# Patient Record
Sex: FEMALE | Race: OTHER | HISPANIC OR LATINO
[De-identification: names, ages, dates, MRNs, and addresses within clinical notes are randomized per-mention and may not be internally consistent; named-entity substitution may affect disease eponyms.]

---

## 2017-04-05 ENCOUNTER — RESULT REVIEW (OUTPATIENT)
Age: 72
End: 2017-04-05

## 2017-05-30 ENCOUNTER — RESULT REVIEW (OUTPATIENT)
Age: 72
End: 2017-05-30

## 2017-07-19 ENCOUNTER — APPOINTMENT (OUTPATIENT)
Dept: MAMMOGRAPHY | Facility: CLINIC | Age: 72
End: 2017-07-19

## 2017-07-19 ENCOUNTER — OUTPATIENT (OUTPATIENT)
Dept: OUTPATIENT SERVICES | Facility: HOSPITAL | Age: 72
LOS: 1 days | End: 2017-07-19
Payer: COMMERCIAL

## 2017-07-19 ENCOUNTER — APPOINTMENT (OUTPATIENT)
Dept: ULTRASOUND IMAGING | Facility: CLINIC | Age: 72
End: 2017-07-19

## 2017-07-19 DIAGNOSIS — Z00.8 ENCOUNTER FOR OTHER GENERAL EXAMINATION: ICD-10-CM

## 2017-07-19 PROCEDURE — G0279: CPT

## 2017-07-19 PROCEDURE — 76642 ULTRASOUND BREAST LIMITED: CPT

## 2017-07-19 PROCEDURE — 77066 DX MAMMO INCL CAD BI: CPT

## 2017-08-28 PROBLEM — N94.89 ENDOMETRIAL MASS: Status: ACTIVE | Noted: 2017-08-28

## 2017-08-28 PROBLEM — D05.12 DUCTAL CARCINOMA IN SITU (DCIS) OF LEFT BREAST: Status: ACTIVE | Noted: 2017-08-28

## 2017-08-29 ENCOUNTER — APPOINTMENT (OUTPATIENT)
Dept: GYNECOLOGIC ONCOLOGY | Facility: CLINIC | Age: 72
End: 2017-08-29
Payer: MEDICARE

## 2017-08-29 VITALS
HEART RATE: 59 BPM | DIASTOLIC BLOOD PRESSURE: 89 MMHG | BODY MASS INDEX: 26.19 KG/M2 | WEIGHT: 146 LBS | HEIGHT: 62.6 IN | SYSTOLIC BLOOD PRESSURE: 148 MMHG

## 2017-08-29 DIAGNOSIS — Z78.9 OTHER SPECIFIED HEALTH STATUS: ICD-10-CM

## 2017-08-29 DIAGNOSIS — N94.89 OTHER SPECIFIED CONDITIONS ASSOCIATED WITH FEMALE GENITAL ORGANS AND MENSTRUAL CYCLE: ICD-10-CM

## 2017-08-29 DIAGNOSIS — N95.0 POSTMENOPAUSAL BLEEDING: ICD-10-CM

## 2017-08-29 DIAGNOSIS — D05.12 INTRADUCTAL CARCINOMA IN SITU OF LEFT BREAST: ICD-10-CM

## 2017-08-29 PROCEDURE — 99205 OFFICE O/P NEW HI 60 MIN: CPT

## 2017-08-29 RX ORDER — CHROMIUM 200 MCG
TABLET ORAL
Refills: 0 | Status: ACTIVE | COMMUNITY

## 2017-09-08 ENCOUNTER — OUTPATIENT (OUTPATIENT)
Dept: OUTPATIENT SERVICES | Facility: HOSPITAL | Age: 72
LOS: 1 days | End: 2017-09-08
Payer: MEDICARE

## 2017-09-08 VITALS
WEIGHT: 147.93 LBS | DIASTOLIC BLOOD PRESSURE: 70 MMHG | TEMPERATURE: 97 F | HEIGHT: 63 IN | RESPIRATION RATE: 18 BRPM | OXYGEN SATURATION: 99 % | HEART RATE: 61 BPM | SYSTOLIC BLOOD PRESSURE: 104 MMHG

## 2017-09-08 DIAGNOSIS — Z90.89 ACQUIRED ABSENCE OF OTHER ORGANS: Chronic | ICD-10-CM

## 2017-09-08 DIAGNOSIS — N94.89 OTHER SPECIFIED CONDITIONS ASSOCIATED WITH FEMALE GENITAL ORGANS AND MENSTRUAL CYCLE: ICD-10-CM

## 2017-09-08 DIAGNOSIS — R94.31 ABNORMAL ELECTROCARDIOGRAM [ECG] [EKG]: ICD-10-CM

## 2017-09-08 DIAGNOSIS — N85.9 NONINFLAMMATORY DISORDER OF UTERUS, UNSPECIFIED: ICD-10-CM

## 2017-09-08 DIAGNOSIS — S82.899A OTHER FRACTURE OF UNSPECIFIED LOWER LEG, INITIAL ENCOUNTER FOR CLOSED FRACTURE: Chronic | ICD-10-CM

## 2017-09-08 DIAGNOSIS — Z98.890 OTHER SPECIFIED POSTPROCEDURAL STATES: Chronic | ICD-10-CM

## 2017-09-08 DIAGNOSIS — H26.9 UNSPECIFIED CATARACT: Chronic | ICD-10-CM

## 2017-09-08 LAB
ALBUMIN SERPL ELPH-MCNC: 4.5 G/DL — SIGNIFICANT CHANGE UP (ref 3.3–5)
ALP SERPL-CCNC: 46 U/L — SIGNIFICANT CHANGE UP (ref 40–120)
ALT FLD-CCNC: 14 U/L — SIGNIFICANT CHANGE UP (ref 4–33)
AST SERPL-CCNC: 16 U/L — SIGNIFICANT CHANGE UP (ref 4–32)
BILIRUB SERPL-MCNC: 0.5 MG/DL — SIGNIFICANT CHANGE UP (ref 0.2–1.2)
BLD GP AB SCN SERPL QL: NEGATIVE — SIGNIFICANT CHANGE UP
BUN SERPL-MCNC: 12 MG/DL — SIGNIFICANT CHANGE UP (ref 7–23)
CALCIUM SERPL-MCNC: 8.6 MG/DL — SIGNIFICANT CHANGE UP (ref 8.4–10.5)
CHLORIDE SERPL-SCNC: 104 MMOL/L — SIGNIFICANT CHANGE UP (ref 98–107)
CO2 SERPL-SCNC: 30 MMOL/L — SIGNIFICANT CHANGE UP (ref 22–31)
CREAT SERPL-MCNC: 0.6 MG/DL — SIGNIFICANT CHANGE UP (ref 0.5–1.3)
GLUCOSE SERPL-MCNC: 84 MG/DL — SIGNIFICANT CHANGE UP (ref 70–99)
HCT VFR BLD CALC: 42.7 % — SIGNIFICANT CHANGE UP (ref 34.5–45)
HGB BLD-MCNC: 13.5 G/DL — SIGNIFICANT CHANGE UP (ref 11.5–15.5)
MCHC RBC-ENTMCNC: 30.2 PG — SIGNIFICANT CHANGE UP (ref 27–34)
MCHC RBC-ENTMCNC: 31.6 % — LOW (ref 32–36)
MCV RBC AUTO: 95.5 FL — SIGNIFICANT CHANGE UP (ref 80–100)
NRBC # FLD: 0 — SIGNIFICANT CHANGE UP
PLATELET # BLD AUTO: 166 K/UL — SIGNIFICANT CHANGE UP (ref 150–400)
PMV BLD: 11.5 FL — SIGNIFICANT CHANGE UP (ref 7–13)
POTASSIUM SERPL-MCNC: 4.6 MMOL/L — SIGNIFICANT CHANGE UP (ref 3.5–5.3)
POTASSIUM SERPL-SCNC: 4.6 MMOL/L — SIGNIFICANT CHANGE UP (ref 3.5–5.3)
PROT SERPL-MCNC: 7.1 G/DL — SIGNIFICANT CHANGE UP (ref 6–8.3)
RBC # BLD: 4.47 M/UL — SIGNIFICANT CHANGE UP (ref 3.8–5.2)
RBC # FLD: 13.6 % — SIGNIFICANT CHANGE UP (ref 10.3–14.5)
RH IG SCN BLD-IMP: POSITIVE — SIGNIFICANT CHANGE UP
SODIUM SERPL-SCNC: 144 MMOL/L — SIGNIFICANT CHANGE UP (ref 135–145)
WBC # BLD: 3.69 K/UL — LOW (ref 3.8–10.5)
WBC # FLD AUTO: 3.69 K/UL — LOW (ref 3.8–10.5)

## 2017-09-08 PROCEDURE — 93010 ELECTROCARDIOGRAM REPORT: CPT

## 2017-09-08 RX ORDER — SODIUM CHLORIDE 9 MG/ML
1000 INJECTION, SOLUTION INTRAVENOUS
Qty: 0 | Refills: 0 | Status: DISCONTINUED | OUTPATIENT
Start: 2017-09-14 | End: 2017-09-29

## 2017-09-08 NOTE — H&P PST ADULT - PROBLEM SELECTOR PLAN 2
Pt walks 10,000 steps daily and exercises 30 minutes daily; pt denies cp, palpitations, sob; pt offers no c/o  EKG done ; noted to be abnormal ; anterolateral ischemia; ekg comparison obtained changes noted; Reviewed with Dr Goncalves ; pt continues to offer no c/o    EKG faxed to Dr Santiago ; s/w Dr Santiago ; pt referred directly to Dr Gillespie for cardiology evaluation ; pt continues to offer no c/o. instructed pt to call 911 or go directly to ER if any cp, sob .. noted pt in agreement    Pt accompanied by niece and spouse went directly from PST to cardiologist for evaluation Pt walks 10,000 steps daily and exercises 30 minutes daily; pt denies cp, palpitations, sob; pt offers no c/o  EKG done ; noted to be abnormal ; on chart ; ekg comparison obtained changes    ;  Reviewed with Dr Goncalves ; pt continues to offer no c/o    EKG faxed to Dr Santiago ; s/w Dr Santiago ; pt referred directly to Dr Gillespie for cardiology evaluation ; pt continues to offer no c/o. instructed pt to call 911 or go directly to ER if any cp, sob .. noted pt in agreement    Pt accompanied by niece and spouse went directly from PST to cardiologist for evaluation    Awaiting cardiac clearance Ti @ surgeons office aware Pt walks 10,000 steps daily and exercises 30 minutes daily; pt denies cp, palpitations, sob; pt offers no c/o  EKG done ; noted to be abnormal ; on chart ; ekg comparison obtained changes    ;  Reviewed with Dr Goncalves ; pt continues to offer no c/o    EKG faxed to Dr Santiago ; s/w Dr Santiago ;ekg and comparison reviewed with Dr Santiago  pt referred directly to Dr Gillespie for cardiology evaluation ; pt continues to offer no c/o. instructed pt to call 911 or go directly to ER if any cp, sob .. noted pt in agreement    Pt accompanied by niece and spouse went directly from PST to cardiologist for evaluation  Dr Goncalves aware    Awaiting cardiac clearance Ti @ surgeons office aware

## 2017-09-08 NOTE — H&P PST ADULT - PSH
Cataract of both eyes  tx surgically  Fracture of ankle  s/p ORIF Left Ankle Fracture  H/O lumpectomy  Left Breast    (normal spontaneous vaginal delivery)  x2  S/P tonsillectomy

## 2017-09-08 NOTE — H&P PST ADULT - LYMPHATIC
supraclavicular L/posterior cervical L/anterior cervical L/anterior cervical R/supraclavicular R/posterior cervical R

## 2017-09-08 NOTE — H&P PST ADULT - ASSESSMENT
Uterine Mass Pt is  a 72 y.o. female pt reports h/o Lumpectomy Left Breast 11/16 secondary to breast cancer. Pt states during w/u a uterine mass was found ; pt to surgeon a c/t scan , mri was done ; pt now presents for surgery ;[  see plan of care]

## 2017-09-08 NOTE — H&P PST ADULT - HISTORY OF PRESENT ILLNESS
Pt is a 72 y.o. female pt reports h/o Lumpectomy Left Breast 11/16 secondary to breast cancer. Pt states during w/u a uterine mass was found ; pt to surgeon a c/t scan , mri was done ; pt now presents for surgery ; see plan of care]     Pt reports occasional vaginal bleeding occasional cramping Pt is a 72 y.o. female pt reports h/o Lumpectomy Left Breast 11/16 secondary to breast cancer. Pt states during w/u a endometrial mass was found ; pt to surgeon a c/t scan , mri was done ; pt now presents for surgery ; see plan of care.    Pt reports occasional vaginal bleeding occasional cramping

## 2017-09-12 ENCOUNTER — OUTPATIENT (OUTPATIENT)
Dept: OUTPATIENT SERVICES | Facility: HOSPITAL | Age: 72
LOS: 1 days | End: 2017-09-12
Payer: COMMERCIAL

## 2017-09-12 DIAGNOSIS — R94.31 ABNORMAL ELECTROCARDIOGRAM [ECG] [EKG]: ICD-10-CM

## 2017-09-12 DIAGNOSIS — Z01.810 ENCOUNTER FOR PREPROCEDURAL CARDIOVASCULAR EXAMINATION: ICD-10-CM

## 2017-09-12 DIAGNOSIS — S82.899A OTHER FRACTURE OF UNSPECIFIED LOWER LEG, INITIAL ENCOUNTER FOR CLOSED FRACTURE: Chronic | ICD-10-CM

## 2017-09-12 DIAGNOSIS — Z90.89 ACQUIRED ABSENCE OF OTHER ORGANS: Chronic | ICD-10-CM

## 2017-09-12 DIAGNOSIS — H26.9 UNSPECIFIED CATARACT: Chronic | ICD-10-CM

## 2017-09-12 DIAGNOSIS — Z98.890 OTHER SPECIFIED POSTPROCEDURAL STATES: Chronic | ICD-10-CM

## 2017-09-12 PROCEDURE — 93018 CV STRESS TEST I&R ONLY: CPT

## 2017-09-12 PROCEDURE — 93016 CV STRESS TEST SUPVJ ONLY: CPT

## 2017-09-12 PROCEDURE — 93017 CV STRESS TEST TRACING ONLY: CPT

## 2017-09-12 PROCEDURE — 93306 TTE W/DOPPLER COMPLETE: CPT

## 2017-09-12 PROCEDURE — 93306 TTE W/DOPPLER COMPLETE: CPT | Mod: 26

## 2017-09-14 ENCOUNTER — OUTPATIENT (OUTPATIENT)
Dept: OUTPATIENT SERVICES | Facility: HOSPITAL | Age: 72
LOS: 1 days | Discharge: HOME | End: 2017-09-14
Payer: MEDICARE

## 2017-09-14 ENCOUNTER — TRANSCRIPTION ENCOUNTER (OUTPATIENT)
Age: 72
End: 2017-09-14

## 2017-09-14 ENCOUNTER — APPOINTMENT (OUTPATIENT)
Dept: GYNECOLOGIC ONCOLOGY | Facility: HOSPITAL | Age: 72
End: 2017-09-14

## 2017-09-14 ENCOUNTER — RESULT REVIEW (OUTPATIENT)
Age: 72
End: 2017-09-14

## 2017-09-14 VITALS
DIASTOLIC BLOOD PRESSURE: 64 MMHG | RESPIRATION RATE: 16 BRPM | OXYGEN SATURATION: 95 % | HEART RATE: 76 BPM | SYSTOLIC BLOOD PRESSURE: 113 MMHG

## 2017-09-14 VITALS
TEMPERATURE: 98 F | HEART RATE: 59 BPM | DIASTOLIC BLOOD PRESSURE: 80 MMHG | HEIGHT: 63 IN | OXYGEN SATURATION: 98 % | RESPIRATION RATE: 16 BRPM | WEIGHT: 147.93 LBS | SYSTOLIC BLOOD PRESSURE: 151 MMHG

## 2017-09-14 DIAGNOSIS — S82.899A OTHER FRACTURE OF UNSPECIFIED LOWER LEG, INITIAL ENCOUNTER FOR CLOSED FRACTURE: Chronic | ICD-10-CM

## 2017-09-14 DIAGNOSIS — H26.9 UNSPECIFIED CATARACT: Chronic | ICD-10-CM

## 2017-09-14 DIAGNOSIS — Z98.890 OTHER SPECIFIED POSTPROCEDURAL STATES: Chronic | ICD-10-CM

## 2017-09-14 DIAGNOSIS — N94.89 OTHER SPECIFIED CONDITIONS ASSOCIATED WITH FEMALE GENITAL ORGANS AND MENSTRUAL CYCLE: ICD-10-CM

## 2017-09-14 DIAGNOSIS — Z90.89 ACQUIRED ABSENCE OF OTHER ORGANS: Chronic | ICD-10-CM

## 2017-09-14 LAB
BASOPHILS # BLD AUTO: 0.01 K/UL — SIGNIFICANT CHANGE UP (ref 0–0.2)
BASOPHILS NFR BLD AUTO: 0.1 % — SIGNIFICANT CHANGE UP (ref 0–2)
BUN SERPL-MCNC: 11 MG/DL — SIGNIFICANT CHANGE UP (ref 7–23)
CALCIUM SERPL-MCNC: 8.4 MG/DL — SIGNIFICANT CHANGE UP (ref 8.4–10.5)
CHLORIDE SERPL-SCNC: 98 MMOL/L — SIGNIFICANT CHANGE UP (ref 98–107)
CO2 SERPL-SCNC: 27 MMOL/L — SIGNIFICANT CHANGE UP (ref 22–31)
CREAT SERPL-MCNC: 0.55 MG/DL — SIGNIFICANT CHANGE UP (ref 0.5–1.3)
EOSINOPHIL # BLD AUTO: 0 K/UL — SIGNIFICANT CHANGE UP (ref 0–0.5)
EOSINOPHIL NFR BLD AUTO: 0 % — SIGNIFICANT CHANGE UP (ref 0–6)
GLUCOSE SERPL-MCNC: 170 MG/DL — HIGH (ref 70–99)
HCT VFR BLD CALC: 38.8 % — SIGNIFICANT CHANGE UP (ref 34.5–45)
HGB BLD-MCNC: 12.7 G/DL — SIGNIFICANT CHANGE UP (ref 11.5–15.5)
IMM GRANULOCYTES # BLD AUTO: 0.03 # — SIGNIFICANT CHANGE UP
IMM GRANULOCYTES NFR BLD AUTO: 0.4 % — SIGNIFICANT CHANGE UP (ref 0–1.5)
LYMPHOCYTES # BLD AUTO: 0.38 K/UL — LOW (ref 1–3.3)
LYMPHOCYTES # BLD AUTO: 4.7 % — LOW (ref 13–44)
MAGNESIUM SERPL-MCNC: 1.6 MG/DL — SIGNIFICANT CHANGE UP (ref 1.6–2.6)
MCHC RBC-ENTMCNC: 31 PG — SIGNIFICANT CHANGE UP (ref 27–34)
MCHC RBC-ENTMCNC: 32.7 % — SIGNIFICANT CHANGE UP (ref 32–36)
MCV RBC AUTO: 94.6 FL — SIGNIFICANT CHANGE UP (ref 80–100)
MONOCYTES # BLD AUTO: 0.18 K/UL — SIGNIFICANT CHANGE UP (ref 0–0.9)
MONOCYTES NFR BLD AUTO: 2.2 % — SIGNIFICANT CHANGE UP (ref 2–14)
NEUTROPHILS # BLD AUTO: 7.41 K/UL — HIGH (ref 1.8–7.4)
NEUTROPHILS NFR BLD AUTO: 92.6 % — HIGH (ref 43–77)
NRBC # FLD: 0 — SIGNIFICANT CHANGE UP
PHOSPHATE SERPL-MCNC: 2.8 MG/DL — SIGNIFICANT CHANGE UP (ref 2.5–4.5)
PLATELET # BLD AUTO: 145 K/UL — LOW (ref 150–400)
PMV BLD: 11.2 FL — SIGNIFICANT CHANGE UP (ref 7–13)
POTASSIUM SERPL-MCNC: 3.2 MMOL/L — LOW (ref 3.5–5.3)
POTASSIUM SERPL-SCNC: 3.2 MMOL/L — LOW (ref 3.5–5.3)
RBC # BLD: 4.1 M/UL — SIGNIFICANT CHANGE UP (ref 3.8–5.2)
RBC # FLD: 13.2 % — SIGNIFICANT CHANGE UP (ref 10.3–14.5)
REVIEW TO FOLLOW: YES — SIGNIFICANT CHANGE UP
RH IG SCN BLD-IMP: POSITIVE — SIGNIFICANT CHANGE UP
SODIUM SERPL-SCNC: 139 MMOL/L — SIGNIFICANT CHANGE UP (ref 135–145)
WBC # BLD: 8.01 K/UL — SIGNIFICANT CHANGE UP (ref 3.8–10.5)
WBC # FLD AUTO: 8.01 K/UL — SIGNIFICANT CHANGE UP (ref 3.8–10.5)

## 2017-09-14 PROCEDURE — 88331 PATH CONSLTJ SURG 1 BLK 1SPC: CPT | Mod: 26

## 2017-09-14 PROCEDURE — 88307 TISSUE EXAM BY PATHOLOGIST: CPT | Mod: 26

## 2017-09-14 PROCEDURE — S2900 ROBOTIC SURGICAL SYSTEM: CPT | Mod: NC

## 2017-09-14 PROCEDURE — 58571 TLH W/T/O 250 G OR LESS: CPT | Mod: GC

## 2017-09-14 RX ORDER — KETOROLAC TROMETHAMINE 30 MG/ML
15 SYRINGE (ML) INJECTION ONCE
Qty: 0 | Refills: 0 | Status: DISCONTINUED | OUTPATIENT
Start: 2017-09-14 | End: 2017-09-14

## 2017-09-14 RX ORDER — FENTANYL CITRATE 50 UG/ML
25 INJECTION INTRAVENOUS
Qty: 0 | Refills: 0 | Status: DISCONTINUED | OUTPATIENT
Start: 2017-09-14 | End: 2017-09-14

## 2017-09-14 RX ORDER — SODIUM CHLORIDE 9 MG/ML
1000 INJECTION, SOLUTION INTRAVENOUS
Qty: 0 | Refills: 0 | Status: DISCONTINUED | OUTPATIENT
Start: 2017-09-14 | End: 2017-09-29

## 2017-09-14 RX ORDER — MEPERIDINE HYDROCHLORIDE 50 MG/ML
12.5 INJECTION INTRAMUSCULAR; INTRAVENOUS; SUBCUTANEOUS
Qty: 0 | Refills: 0 | Status: DISCONTINUED | OUTPATIENT
Start: 2017-09-14 | End: 2017-09-14

## 2017-09-14 RX ORDER — FENTANYL CITRATE 50 UG/ML
50 INJECTION INTRAVENOUS
Qty: 0 | Refills: 0 | Status: DISCONTINUED | OUTPATIENT
Start: 2017-09-14 | End: 2017-09-14

## 2017-09-14 RX ORDER — POTASSIUM CHLORIDE 20 MEQ
20 PACKET (EA) ORAL
Qty: 0 | Refills: 0 | Status: COMPLETED | OUTPATIENT
Start: 2017-09-14 | End: 2017-09-14

## 2017-09-14 RX ORDER — HEPARIN SODIUM 5000 [USP'U]/ML
5000 INJECTION INTRAVENOUS; SUBCUTANEOUS ONCE
Qty: 0 | Refills: 0 | Status: COMPLETED | OUTPATIENT
Start: 2017-09-14 | End: 2017-09-14

## 2017-09-14 RX ORDER — ANASTROZOLE 1 MG/1
1 TABLET ORAL
Qty: 0 | Refills: 0 | COMMUNITY

## 2017-09-14 RX ORDER — DENOSUMAB 60 MG/ML
0 INJECTION SUBCUTANEOUS
Qty: 0 | Refills: 0 | COMMUNITY

## 2017-09-14 RX ADMIN — Medication 20 MILLIEQUIVALENT(S): at 17:13

## 2017-09-14 RX ADMIN — FENTANYL CITRATE 50 MICROGRAM(S): 50 INJECTION INTRAVENOUS at 12:59

## 2017-09-14 RX ADMIN — Medication 20 MILLIEQUIVALENT(S): at 19:10

## 2017-09-14 RX ADMIN — FENTANYL CITRATE 50 MICROGRAM(S): 50 INJECTION INTRAVENOUS at 13:28

## 2017-09-14 RX ADMIN — FENTANYL CITRATE 50 MICROGRAM(S): 50 INJECTION INTRAVENOUS at 14:45

## 2017-09-14 RX ADMIN — SODIUM CHLORIDE 30 MILLILITER(S): 9 INJECTION, SOLUTION INTRAVENOUS at 09:20

## 2017-09-14 RX ADMIN — Medication 15 MILLIGRAM(S): at 18:40

## 2017-09-14 RX ADMIN — Medication 15 MILLIGRAM(S): at 18:21

## 2017-09-14 RX ADMIN — SODIUM CHLORIDE 110 MILLILITER(S): 9 INJECTION, SOLUTION INTRAVENOUS at 13:10

## 2017-09-14 RX ADMIN — FENTANYL CITRATE 50 MICROGRAM(S): 50 INJECTION INTRAVENOUS at 13:45

## 2017-09-14 RX ADMIN — FENTANYL CITRATE 50 MICROGRAM(S): 50 INJECTION INTRAVENOUS at 14:27

## 2017-09-14 RX ADMIN — FENTANYL CITRATE 50 MICROGRAM(S): 50 INJECTION INTRAVENOUS at 12:55

## 2017-09-14 RX ADMIN — FENTANYL CITRATE 50 MICROGRAM(S): 50 INJECTION INTRAVENOUS at 12:39

## 2017-09-14 RX ADMIN — HEPARIN SODIUM 5000 UNIT(S): 5000 INJECTION INTRAVENOUS; SUBCUTANEOUS at 09:20

## 2017-09-14 NOTE — ASU DISCHARGE PLAN (ADULT/PEDIATRIC). - POST OP PHONE #
home: [phone] cell: [phone] pt. granted permission to leave message /and or speak with whoever answers the phone.

## 2017-09-14 NOTE — ASU DISCHARGE PLAN (ADULT/PEDIATRIC). - MEDICATION SUMMARY - MEDICATIONS TO TAKE
I will START or STAY ON the medications listed below when I get home from the hospital:    vitamin d  -- 1 tab(s) by mouth once a day  -- Indication: For Home med    calcium  -- 1 tab(s) by mouth once a day am  -- Indication: For Home med    Percocet 5/325 oral tablet  -- 1 tab(s) by mouth every 6 hours, As Needed -for severe pain MDD:4 tabs   -- Caution federal law prohibits the transfer of this drug to any person other  than the person for whom it was prescribed.  May cause drowsiness.  Alcohol may intensify this effect.  Use care when operating dangerous machinery.  This prescription cannot be refilled.  This product contains acetaminophen.  Do not use  with any other product containing acetaminophen to prevent possible liver damage.  Using more of this medication than prescribed may cause serious breathing problems.    -- Indication: For Pain    Naprosyn 500 mg oral tablet  -- 1 tab(s) by mouth every 12 hours, As Needed -for moderate pain   -- Check with your doctor before becoming pregnant.  May cause drowsiness or dizziness.  Obtain medical advice before taking any non-prescription drugs as some may affect the action of this medication.  Take with food or milk.    -- Indication: For Pain    Arimidex 1 mg oral tablet  -- 1 tab(s) by mouth once a day am  -- Indication: For Home med    Prolia 60 mg/mL subcutaneous solution  --  subcutaneous every 6 months for osteoporosis  -- Indication: For Home med

## 2017-09-14 NOTE — BRIEF OPERATIVE NOTE - OPERATION/FINDINGS
Small retroverted uterus on bimanual exam, grossly normal uterus, tubes, and ovaries b/l during laparoscopy. Grossly normal mesentary, liver, and spleen on abdominal examination. Frozen section showed benign endometrial polyp.

## 2017-09-14 NOTE — BRIEF OPERATIVE NOTE - PRE-OP DX
Endometrial thickening on ultra sound  09/14/2017    Active  Abelardo García  Postmenopausal bleeding  09/14/2017    Active  Abelardo García

## 2017-09-14 NOTE — ASU DISCHARGE PLAN (ADULT/PEDIATRIC). - NURSING INSTRUCTIONS
Make follow-up appointment. Please call your doctor if you experience fever greater than 100.4, chills, nausea, vomiting or if you notice new redness, swelling, discoloration, or discharge from your abdominal incisions. Also notify your doctor if you experience severe pain that is not relieved with medications. Narcotic pain medications may cause drowsiness and/or constipation. Do not drive while taking percocet. Drink plenty of fluids to promote hydration and increase your fiber intake. Shower as directed by the doctor. Do not scrub incision site. Allow soapy water to run over them and pat dry. Make follow-up appointment. Please call your doctor if you experience fever greater than 100.4, chills, nausea, vomiting or if you notice new redness, swelling, discoloration, or discharge from your abdominal incisions. Also notify your doctor if you experience severe pain that is not relieved with medications. Narcotic pain medications may cause drowsiness and/or constipation. Do not drive while taking percocet. Drink plenty of fluids to promote hydration and increase your fiber intake. Remove pink dressing in 48 hours. Leave steri strips intact. Shower as directed by the doctor. Do not scrub incision site. Allow soapy water to run over them and pat dry. Do not remove steri strips. They will fall off on their own or doctor to remove during follow-up visit. Make follow-up appointment. Please call your doctor if you experience fever greater than 100.4, chills, nausea, vomiting or if you notice new redness, swelling, discoloration, or discharge from your abdominal incisions. Also notify your doctor if you experience severe pain that is not relieved with medications. Narcotic pain medications may cause drowsiness and/or constipation. Do not drive while taking percocet. Drink plenty of fluids to promote hydration and increase your fiber intake. Remove pink dressing in 48 hours. Do not remove steri strips. They will fall off on their own or doctor to remove during follow-up visit. Shower as directed by the doctor. Do not scrub incision site. Allow soapy water to run over them and pat dry.     Nothing in vagina, no intercourse, no douching, no tampons, no tub baths, and no swimming for 2 weeks or until cleared by your doctor. Use sanitary pads if needed.  Do not use tampons. This will help prevent a vaginal infection.

## 2017-09-14 NOTE — ASU DISCHARGE PLAN (ADULT/PEDIATRIC). - ACTIVITY LEVEL
weight bearing as tolerated/no douching/no tampons/no intercourse/no heavy lifting/nothing per rectum/nothing per vagina/no tub baths

## 2017-09-14 NOTE — BRIEF OPERATIVE NOTE - PROCEDURE
<<-----Click on this checkbox to enter Procedure Hysterectomy, robot-assisted, laparoscopic, with BSO  09/14/2017    Active  ECRFIELD

## 2017-09-14 NOTE — ASU DISCHARGE PLAN (ADULT/PEDIATRIC). - NOTIFY
Persistent Nausea and Vomiting/Swelling that continues/GYN Fever>100.4/Inability to Tolerate Liquids or Foods/Bleeding that does not stop/Unable to Urinate/Pain not relieved by Medications

## 2017-09-21 LAB — SURGICAL PATHOLOGY STUDY: SIGNIFICANT CHANGE UP

## 2017-09-29 ENCOUNTER — APPOINTMENT (OUTPATIENT)
Dept: GYNECOLOGIC ONCOLOGY | Facility: CLINIC | Age: 72
End: 2017-09-29
Payer: MEDICARE

## 2017-09-29 VITALS
WEIGHT: 144 LBS | HEART RATE: 76 BPM | HEIGHT: 62 IN | SYSTOLIC BLOOD PRESSURE: 125 MMHG | BODY MASS INDEX: 26.5 KG/M2 | DIASTOLIC BLOOD PRESSURE: 83 MMHG | TEMPERATURE: 98.8 F

## 2017-09-29 PROCEDURE — 99024 POSTOP FOLLOW-UP VISIT: CPT | Mod: NC

## 2017-10-30 ENCOUNTER — APPOINTMENT (OUTPATIENT)
Dept: GYNECOLOGIC ONCOLOGY | Facility: CLINIC | Age: 72
End: 2017-10-30
Payer: MEDICARE

## 2017-10-30 VITALS
TEMPERATURE: 97.6 F | DIASTOLIC BLOOD PRESSURE: 75 MMHG | BODY MASS INDEX: 26.31 KG/M2 | WEIGHT: 143 LBS | HEIGHT: 62 IN | HEART RATE: 61 BPM | SYSTOLIC BLOOD PRESSURE: 131 MMHG

## 2017-10-30 DIAGNOSIS — N84.0 POLYP OF CORPUS UTERI: ICD-10-CM

## 2017-10-30 DIAGNOSIS — D25.9 LEIOMYOMA OF UTERUS, UNSPECIFIED: ICD-10-CM

## 2017-10-30 PROCEDURE — 99024 POSTOP FOLLOW-UP VISIT: CPT

## 2018-01-11 ENCOUNTER — APPOINTMENT (OUTPATIENT)
Dept: UROLOGY | Facility: CLINIC | Age: 73
End: 2018-01-11
Payer: MEDICARE

## 2018-01-11 VITALS
HEIGHT: 63 IN | TEMPERATURE: 97.6 F | HEART RATE: 75 BPM | BODY MASS INDEX: 26.58 KG/M2 | WEIGHT: 150 LBS | SYSTOLIC BLOOD PRESSURE: 132 MMHG | DIASTOLIC BLOOD PRESSURE: 81 MMHG

## 2018-01-11 LAB
BILIRUB UR QL STRIP: NORMAL
CLARITY UR: CLEAR
COLLECTION METHOD: NORMAL
GLUCOSE UR-MCNC: NORMAL
HCG UR QL: 0.2 EU/DL
HGB UR QL STRIP.AUTO: NORMAL
KETONES UR-MCNC: NORMAL
LEUKOCYTE ESTERASE UR QL STRIP: NORMAL
NITRITE UR QL STRIP: NORMAL
PH UR STRIP: 6.5
PROT UR STRIP-MCNC: NORMAL
SP GR UR STRIP: 1.02

## 2018-01-11 PROCEDURE — 51798 US URINE CAPACITY MEASURE: CPT

## 2018-01-11 PROCEDURE — 81003 URINALYSIS AUTO W/O SCOPE: CPT | Mod: QW

## 2018-01-11 PROCEDURE — 99204 OFFICE O/P NEW MOD 45 MIN: CPT | Mod: 25

## 2018-01-12 LAB
APPEARANCE: CLEAR
BACTERIA: NEGATIVE
BILIRUBIN URINE: NEGATIVE
BLOOD URINE: ABNORMAL
COLOR: YELLOW
CORE LAB FLUID CYTOLOGY: NORMAL
GLUCOSE QUALITATIVE U: NEGATIVE MG/DL
KETONES URINE: NEGATIVE
LEUKOCYTE ESTERASE URINE: ABNORMAL
MICROSCOPIC-UA: NORMAL
NITRITE URINE: NEGATIVE
PH URINE: 6
PROTEIN URINE: NEGATIVE MG/DL
RED BLOOD CELLS URINE: 10 /HPF
SPECIFIC GRAVITY URINE: 1.02
SQUAMOUS EPITHELIAL CELLS: 3 /HPF
UROBILINOGEN URINE: NEGATIVE MG/DL
WHITE BLOOD CELLS URINE: 4 /HPF

## 2018-01-16 LAB — BACTERIA UR CULT: NORMAL

## 2018-02-07 ENCOUNTER — APPOINTMENT (OUTPATIENT)
Dept: UROLOGY | Facility: CLINIC | Age: 73
End: 2018-02-07
Payer: COMMERCIAL

## 2018-02-07 VITALS
HEIGHT: 63 IN | BODY MASS INDEX: 26.58 KG/M2 | SYSTOLIC BLOOD PRESSURE: 125 MMHG | WEIGHT: 150 LBS | DIASTOLIC BLOOD PRESSURE: 77 MMHG

## 2018-02-07 DIAGNOSIS — N28.1 CYST OF KIDNEY, ACQUIRED: ICD-10-CM

## 2018-02-07 PROCEDURE — 99213 OFFICE O/P EST LOW 20 MIN: CPT | Mod: 25

## 2018-02-07 PROCEDURE — 52000 CYSTOURETHROSCOPY: CPT

## 2018-05-08 ENCOUNTER — MOBILE ON CALL (OUTPATIENT)
Age: 73
End: 2018-05-08

## 2018-05-08 LAB
APPEARANCE: CLEAR
BACTERIA: NEGATIVE
BILIRUBIN URINE: NEGATIVE
BLOOD URINE: ABNORMAL
COLOR: YELLOW
GLUCOSE QUALITATIVE U: NEGATIVE MG/DL
HYALINE CASTS: 2 /LPF
KETONES URINE: NEGATIVE
LEUKOCYTE ESTERASE URINE: NEGATIVE
MICROSCOPIC-UA: NORMAL
NITRITE URINE: NEGATIVE
PH URINE: 7
PROTEIN URINE: NEGATIVE MG/DL
RED BLOOD CELLS URINE: 1 /HPF
SPECIFIC GRAVITY URINE: 1.02
SQUAMOUS EPITHELIAL CELLS: 2 /HPF
UROBILINOGEN URINE: NEGATIVE MG/DL
WHITE BLOOD CELLS URINE: 0 /HPF

## 2018-05-09 LAB — BACTERIA UR CULT: NORMAL

## 2018-05-16 ENCOUNTER — APPOINTMENT (OUTPATIENT)
Dept: UROLOGY | Facility: CLINIC | Age: 73
End: 2018-05-16
Payer: COMMERCIAL

## 2018-05-16 VITALS
WEIGHT: 150 LBS | DIASTOLIC BLOOD PRESSURE: 89 MMHG | SYSTOLIC BLOOD PRESSURE: 155 MMHG | HEIGHT: 63 IN | HEART RATE: 68 BPM | BODY MASS INDEX: 26.58 KG/M2

## 2018-05-16 PROCEDURE — 52224 CYSTOSCOPY AND TREATMENT: CPT

## 2018-05-22 ENCOUNTER — MESSAGE (OUTPATIENT)
Age: 73
End: 2018-05-22

## 2018-05-23 ENCOUNTER — APPOINTMENT (OUTPATIENT)
Dept: UROLOGY | Facility: CLINIC | Age: 73
End: 2018-05-23
Payer: COMMERCIAL

## 2018-05-23 VITALS
SYSTOLIC BLOOD PRESSURE: 145 MMHG | HEIGHT: 63 IN | DIASTOLIC BLOOD PRESSURE: 96 MMHG | BODY MASS INDEX: 26.58 KG/M2 | WEIGHT: 150 LBS | TEMPERATURE: 98 F | HEART RATE: 84 BPM

## 2018-05-23 DIAGNOSIS — R35.1 NOCTURIA: ICD-10-CM

## 2018-05-23 DIAGNOSIS — N32.9 BLADDER DISORDER, UNSPECIFIED: ICD-10-CM

## 2018-05-23 DIAGNOSIS — N39.46 MIXED INCONTINENCE: ICD-10-CM

## 2018-05-23 DIAGNOSIS — R31.29 OTHER MICROSCOPIC HEMATURIA: ICD-10-CM

## 2018-05-23 LAB — CORE LAB BIOPSY: NORMAL

## 2018-05-23 PROCEDURE — 99213 OFFICE O/P EST LOW 20 MIN: CPT

## 2018-10-03 PROBLEM — D25.9 LEIOMYOMA OF UTERUS: Status: ACTIVE | Noted: 2017-09-29

## 2018-10-03 PROBLEM — N84.0 ENDOMETRIAL POLYP: Status: ACTIVE | Noted: 2017-09-29

## 2018-10-17 NOTE — H&P PST ADULT - PROBLEM SELECTOR PLAN 1
normal... Procedure as booked ; Robotic Assisted total laparoscopic Bilateral Salpingo Oophorectomy possible staging; pt states also Hysterectomy  ; call to OR Booking s/w Tiara; call to surgeons office message left for TY awaiting return call    Pre op instructions given to pt including Pepcid and Hibiclens ; pt appears to have a good understanding of pre op instructions    Pt to Dr Santiago for pre op m/c Procedure as booked ; Robotic Assisted total laparoscopic Bilateral Salpingo Oophorectomy possible staging; pt states also Hysterectomy  ; call to OR Booking s/w Tiara; call to surgeons office s/w Kevyn;  Correct procedure "Robotic assisted Total laparoscopic Hysterectomy Bilateral Salpingo Oophorectomy possible Staging "      Pre op instructions given to pt including Pepcid and Hibiclens ; pt appears to have a good understanding of pre op instructions    Pt to Dr Santiago for pre op m/c Procedure as booked ; Robotic Assisted total laparoscopic Bilateral Salpingo Oophorectomy possible staging; pt states also Hysterectomy  ; call to OR Booking s/w Tiara; call to surgeons office s/w Kevyn;  Correct procedure "Robotic assisted Total laparoscopic Hysterectomy Bilateral Salpingo Oophorectomy possible Staging "    OR booking to correct      Pre op instructions given to pt including Pepcid and Hibiclens ; pt appears to have a good understanding of pre op instructions    Pt to Dr Santiago for pre op m/c Procedure as booked ; Robotic Assisted total laparoscopic Bilateral Salpingo Oophorectomy possible staging; pt states also Hysterectomy  ; call to OR Booking s/w Tiara; call to surgeons office s/w Kevyn;  Correct procedure "Robotic assisted Total laparoscopic Hysterectomy Bilateral Salpingo Oophorectomy possible Staging "  9/12/17 New booking sheet ; Robotic Assisted Total Laparoscopic Hysterectomy Bilateral Salpingo Oophorectomy Possible Staging     OR booking to correct      Pre op instructions given to pt including Pepcid and Hibiclens ; pt appears to have a good understanding of pre op instructions    Pt to Dr Santiago for pre op m/c

## 2020-04-02 PROBLEM — N32.9 LESION OF BLADDER: Status: ACTIVE | Noted: 2018-05-16

## 2020-05-27 PROBLEM — M81.0 AGE-RELATED OSTEOPOROSIS WITHOUT CURRENT PATHOLOGICAL FRACTURE: Chronic | Status: ACTIVE | Noted: 2017-09-08

## 2020-05-27 PROBLEM — C50.919 MALIGNANT NEOPLASM OF UNSPECIFIED SITE OF UNSPECIFIED FEMALE BREAST: Chronic | Status: ACTIVE | Noted: 2017-09-08

## 2020-06-12 ENCOUNTER — APPOINTMENT (OUTPATIENT)
Dept: NEUROLOGY | Facility: CLINIC | Age: 75
End: 2020-06-12
Payer: MEDICARE

## 2020-06-12 PROCEDURE — 99204 OFFICE O/P NEW MOD 45 MIN: CPT | Mod: 95

## 2020-06-12 NOTE — REVIEW OF SYSTEMS
[de-identified] : Left UE Rest tremors\par Swearing to left on walking at times [Negative] : Heme/Lymph

## 2020-06-12 NOTE — DISCUSSION/SUMMARY
[FreeTextEntry1] : Limited examination as patient seen on tele health visit due to COVID emergency.\par Because of asymmetric onset of symptoms left side more than right side rest tremor with posture instability breast cancer recently rule out CNS pathology.\par Possible asymmetric onset of early Parkinson disease.\par Recommend patient to have MRI of the brain with and without contrast to rule out structural pathology.\par Recommend exercises as tolerated.\par Get lab work from your office.\par If the above workup is negative followup evaluation in the office.\par Will consider starting her on medications after evaluation in person.\par Patient education provided and discussed with patient at length.\par Followup with you on oncology for other medical problems.

## 2020-06-12 NOTE — PHYSICAL EXAM
[Cranial Nerves Oculomotor (III)] : extraocular motion intact [Cranial Nerves Vestibulocochlear (VIII)] : hearing was intact bilaterally [Cranial Nerves Trigeminal (V)] : facial sensation intact symmetrically [Cranial Nerves Facial (VII)] : face symmetrical [Cranial Nerves Glossopharyngeal (IX)] : tongue and palate midline [Cranial Nerves Accessory (XI - Cranial And Spinal)] : head turning and shoulder shrug symmetric [Cranial Nerves Hypoglossal (XII)] : there was no tongue deviation with protrusion [Tremor] : a tremor present [FreeTextEntry4] : Intact [FreeTextEntry5] : Pupils and Fundi not able to test [FreeTextEntry6] : No drift Rest tremor noted in Left UE.\par Can not test rigidity or power [FreeTextEntry8] : Left UE [Sclera] : the sclera and conjunctiva were normal [FreeTextEntry9] : Reflexes not able to test [Outer Ear] : the ears and nose were normal in appearance [No CVA Tenderness] : no ~M costovertebral angle tenderness [Neck Appearance] : the appearance of the neck was normal [Abnormal Walk] : normal gait [Skin Color & Pigmentation] : normal skin color and pigmentation

## 2020-06-12 NOTE — REASON FOR VISIT
[Home] : at home, [unfilled] , at the time of the visit. [Verbal consent obtained from patient] : the patient, [unfilled] [Medical Office: ([facility])___] : at the medical office located in  [FreeTextEntry3] : [facility] [FreeTextEntry4] : Dr. LUIS Turpin

## 2020-06-12 NOTE — HISTORY OF PRESENT ILLNESS
[FreeTextEntry1] : Verbal consent  obtained from patient for Tele health visit. Pt seen on Tele health visit due to COVID 19 emergency.\par \par She 75-year-old patient with a history of breast CA status post surgery and radiation therapy being followed by oncologist, chronic low back pain and muscle cramps referred for evaluation of persistent left upper extremity rest tremors for last 4-5 months noticed increasing recently. Does not bother her when she is active. No tremor involving right side. Doesn't bother her in other areas of the body. Mild difficulties with walking  Swearing to the left side at times. No focal weakness, paresthesias or numbness. No dizziness or headaches or visual disturbance associated with the symptoms. No difficulty with the eating or swallowing.\par Unable to get to see you do to Covid related medical emergency. No recent lab work is available. No neurological symptoms in the past.

## 2020-07-07 ENCOUNTER — TRANSCRIPTION ENCOUNTER (OUTPATIENT)
Age: 75
End: 2020-07-07

## 2020-07-08 ENCOUNTER — APPOINTMENT (OUTPATIENT)
Dept: NEUROLOGY | Facility: CLINIC | Age: 75
End: 2020-07-08
Payer: MEDICARE

## 2020-07-08 PROCEDURE — 95816 EEG AWAKE AND DROWSY: CPT

## 2020-07-16 ENCOUNTER — APPOINTMENT (OUTPATIENT)
Dept: NEUROLOGY | Facility: CLINIC | Age: 75
End: 2020-07-16
Payer: MEDICARE

## 2020-07-16 VITALS
DIASTOLIC BLOOD PRESSURE: 88 MMHG | TEMPERATURE: 98 F | BODY MASS INDEX: 26.4 KG/M2 | SYSTOLIC BLOOD PRESSURE: 134 MMHG | HEIGHT: 63 IN | WEIGHT: 149 LBS | HEART RATE: 73 BPM

## 2020-07-16 PROCEDURE — 99215 OFFICE O/P EST HI 40 MIN: CPT

## 2020-07-16 RX ORDER — OXYBUTYNIN CHLORIDE 5 MG/1
5 TABLET ORAL
Qty: 30 | Refills: 5 | Status: DISCONTINUED | COMMUNITY
Start: 2018-01-11 | End: 2020-07-16

## 2020-07-16 NOTE — PHYSICAL EXAM
[General Appearance - Alert] : alert [Oriented To Time, Place, And Person] : oriented to person, place, and time [General Appearance - In No Acute Distress] : in no acute distress [Impaired Insight] : insight and judgment were intact [Affect] : the affect was normal [Time] : oriented to time [Person] : oriented to person [Place] : oriented to place [Visual Intact] : visual attention was ~T not ~L decreased [Concentration Intact] : normal concentrating ability [Repeating Phrases] : no difficulty repeating a phrase [Naming Objects] : no difficulty naming common objects [Comprehension] : comprehension intact [Fluency] : fluency intact [Writing A Sentence] : no difficulty writing a sentence [Past History] : adequate knowledge of personal past history [Reading] : reading intact [Cranial Nerves Oculomotor (III)] : extraocular motion intact [Cranial Nerves Facial (VII)] : face symmetrical [Cranial Nerves Optic (II)] : visual acuity intact bilaterally,  visual fields full to confrontation, pupils equal round and reactive to light [Cranial Nerves Trigeminal (V)] : facial sensation intact symmetrically [Cranial Nerves Accessory (XI - Cranial And Spinal)] : head turning and shoulder shrug symmetric [Cranial Nerves Glossopharyngeal (IX)] : tongue and palate midline [Cranial Nerves Vestibulocochlear (VIII)] : hearing was intact bilaterally [Motor Strength] : muscle strength was normal in all four extremities [Cranial Nerves Hypoglossal (XII)] : there was no tongue deviation with protrusion [No Muscle Atrophy] : normal bulk in all four extremities [Sensation Tactile Decrease] : light touch was intact [Past-pointing] : there was no past-pointing [Limited Balance] : the patient's balance was impaired [Tremor] : no tremor present [2+] : Triceps left 2+ [1+] : Ankle jerk left 1+ [Plantar Reflex Right Only] : normal on the right [Plantar Reflex Left Only] : normal on the left [FreeTextEntry6] : Swaying to Rt side when sitting [PERRL With Normal Accommodation] : pupils were equal in size, round, reactive to light, with normal accommodation [Sclera] : the sclera and conjunctiva were normal [FreeTextEntry8] : Postural instability [Extraocular Movements] : extraocular movements were intact [Outer Ear] : the ears and nose were normal in appearance [Oropharynx] : the oropharynx was normal [Neck Cervical Mass (___cm)] : no neck mass was observed [Neck Appearance] : the appearance of the neck was normal [Jugular Venous Distention Increased] : there was no jugular-venous distention [Thyroid Diffuse Enlargement] : the thyroid was not enlarged [Thyroid Nodule] : there were no palpable thyroid nodules [Auscultation Breath Sounds / Voice Sounds] : lungs were clear to auscultation bilaterally [Heart Rate And Rhythm] : heart rate was normal and rhythm regular [Heart Sounds] : normal S1 and S2 [Murmurs] : no murmurs [Heart Sounds Gallop] : no gallops [Heart Sounds Pericardial Friction Rub] : no pericardial rub [Full Pulse] : the pedal pulses are present [Edema] : there was no peripheral edema [Abdomen Soft] : soft [Abdomen Tenderness] : non-tender [Bowel Sounds] : normal bowel sounds [No CVA Tenderness] : no ~M costovertebral angle tenderness [Abdomen Mass (___ Cm)] : no abdominal mass palpated [No Spinal Tenderness] : no spinal tenderness [Nail Clubbing] : no clubbing  or cyanosis of the fingernails [Motor Tone] : muscle strength and tone were normal [FreeTextEntry1] : Instability [Musculoskeletal - Swelling] : no joint swelling seen [Skin Color & Pigmentation] : normal skin color and pigmentation [Skin Turgor] : normal skin turgor [] : no rash

## 2020-07-16 NOTE — REASON FOR VISIT
[Follow-Up: _____] : a [unfilled] follow-up visit [FreeTextEntry1] : Follow up for pt with Rest tremors difficulty with using left UE and Imbalance

## 2020-07-16 NOTE — HISTORY OF PRESENT ILLNESS
[FreeTextEntry1] : She is a 75-year-old patient with history of severe status post surgery and radiation therapy and chemotherapy currently with chronic low back pain coming here for followup and evaluation for persistent rest tremor left more than right side with the difficulty with ambulation and  swearing  to the right side while walking. Patient was seen and evaluated on [facility] medicine in June and had MRI and EEG done.\par MRI positive for right frontal calcified meningioma which doesn't explain her symptoms and EEG the mild bilateral slowing. No epileptiform activity noted.\par Patient now coming here for evaluation.

## 2020-07-16 NOTE — DATA REVIEWED
[de-identified] : 8 mm enhancing extra axial mass in Rt frontal convexity consistent with partiially calcified Meningioma.\par Minimal microvascular changes [de-identified] : Mild bilat slow

## 2020-07-16 NOTE — DISCUSSION/SUMMARY
[FreeTextEntry1] : Patient with recent onset symptoms of Parkinson disease consistent with rest tremors left more than right side with cogwheel rigidity and postural instability coming here for evaluation.\par Symptoms consistent with recent onset of Parkinson disease.\par MRI scan showed incidental right frontal meningioma doesn't explain her symptoms.\par EEG mild slowing of the background activity. Recommend patient to start on Sinemet 25/100 half tablet twice a day with a gradual increments to 3 times a day to one full tablet 3 times a day.\par Start physical therapy for improvement in her postural instability.\par Get medical records from orthopedic surgeon an MRI scan was of some lower back.\par  Followup evaluation in one month.\par Patient education provided regarding Parkinson disease and exercise given to patient.\par \par

## 2020-07-16 NOTE — REVIEW OF SYSTEMS
[Poor Coordination] : poor coordination [Difficulty Walking] : difficulty walking [Negative] : Endocrine [de-identified] : Rest tremor\par rigidity\par Ch LBP

## 2020-08-18 ENCOUNTER — APPOINTMENT (OUTPATIENT)
Dept: NEUROLOGY | Facility: CLINIC | Age: 75
End: 2020-08-18
Payer: MEDICARE

## 2020-08-18 VITALS
HEART RATE: 74 BPM | WEIGHT: 149 LBS | HEIGHT: 63 IN | TEMPERATURE: 98 F | BODY MASS INDEX: 26.4 KG/M2 | SYSTOLIC BLOOD PRESSURE: 116 MMHG | DIASTOLIC BLOOD PRESSURE: 78 MMHG

## 2020-08-18 PROCEDURE — 99214 OFFICE O/P EST MOD 30 MIN: CPT

## 2020-08-18 NOTE — REASON FOR VISIT
[Follow-Up: _____] : a [unfilled] follow-up visit [FreeTextEntry1] : Folllow up fro pt with PD with Rest tremors left > Rt and postural instability and incidental meningioma

## 2020-08-18 NOTE — HISTORY OF PRESENT ILLNESS
[FreeTextEntry1] : She is 75-year-old patient coming here for followup evaluation for persistent symptoms involving left upper extremity more than right side rest tremors with difficulty with ambulation and postural instability. Diagnosed to have Parkinson disease and recommended workup including MRI and EEG.\par Noted to have incidental calcified right frontal meningioma.\par Recommended to start physical therapy and started on Sinemet which patient did not start. Coming here for followup evaluation with persistent symptoms of left more than right side cogwheel rigidity, rest tremor, postural instability.\par Patient wants to know if there is any definite testing for Parkinson disease.\par There is some symptomatic improvement with the physical therapy including stretching but still has difficulties with rest tremor and postural instability and cogwheel rigidity consistent.

## 2020-08-18 NOTE — DATA REVIEWED
[de-identified] : Mild bilat slow [de-identified] : 8 mm enhancing extra axial mass in Rt frontal convexity consistent with partiially calcified Meningioma.\par Minimal microvascular changes

## 2020-08-18 NOTE — REVIEW OF SYSTEMS
[Poor Coordination] : poor coordination [Difficulty Walking] : difficulty walking [Negative] : Heme/Lymph [de-identified] : Rest tremor\par rigidity\par Ch LBP

## 2020-08-18 NOTE — DISCUSSION/SUMMARY
[FreeTextEntry1] : Patient with recent onset of Parkinson disease symptoms with rest tremor left more than right side with cogwheel rigidity and postural instability.\par Incidental 8mm calcified meningioma on the right frontal area.\par Patient started physical therapy with some relief of her symptoms but persistent symptoms of symptoms of Parkinson disease.\par Patient requesting to have confirmatory test.\par Will request for NM spect DAVID  scan if approved by Insurance.\par Medication Sinemet 25/100 half tablet 3 times a day and adjust the dose to one tablet 3 times a day which will be therapeutic and improve her symptoms.\par Patient education provided and discussed with the patient.\par Followup evaluation in 6-8 weeks' time.\par Follow up with you for other medical problems.\par \par

## 2020-08-18 NOTE — PHYSICAL EXAM
[General Appearance - Alert] : alert [General Appearance - In No Acute Distress] : in no acute distress [Oriented To Time, Place, And Person] : oriented to person, place, and time [Impaired Insight] : insight and judgment were intact [Affect] : the affect was normal [Person] : oriented to person [Place] : oriented to place [Time] : oriented to time [Concentration Intact] : normal concentrating ability [Naming Objects] : no difficulty naming common objects [Visual Intact] : visual attention was ~T not ~L decreased [Repeating Phrases] : no difficulty repeating a phrase [Writing A Sentence] : no difficulty writing a sentence [Fluency] : fluency intact [Comprehension] : comprehension intact [Reading] : reading intact [Past History] : adequate knowledge of personal past history [Cranial Nerves Optic (II)] : visual acuity intact bilaterally,  visual fields full to confrontation, pupils equal round and reactive to light [Cranial Nerves Oculomotor (III)] : extraocular motion intact [Cranial Nerves Trigeminal (V)] : facial sensation intact symmetrically [Cranial Nerves Facial (VII)] : face symmetrical [Cranial Nerves Vestibulocochlear (VIII)] : hearing was intact bilaterally [Cranial Nerves Glossopharyngeal (IX)] : tongue and palate midline [Cranial Nerves Accessory (XI - Cranial And Spinal)] : head turning and shoulder shrug symmetric [Cranial Nerves Hypoglossal (XII)] : there was no tongue deviation with protrusion [Motor Strength] : muscle strength was normal in all four extremities [No Muscle Atrophy] : normal bulk in all four extremities [Sensation Tactile Decrease] : light touch was intact [Limited Balance] : the patient's balance was impaired [Past-pointing] : there was no past-pointing [Tremor] : no tremor present [2+] : Brachioradialis left 2+ [1+] : Ankle jerk left 1+ [Plantar Reflex Right Only] : normal on the right [Plantar Reflex Left Only] : normal on the left [FreeTextEntry6] : Swaying to Rt side when sitting [FreeTextEntry8] : Postural instability [PERRL With Normal Accommodation] : pupils were equal in size, round, reactive to light, with normal accommodation [Sclera] : the sclera and conjunctiva were normal [Extraocular Movements] : extraocular movements were intact [Outer Ear] : the ears and nose were normal in appearance [Oropharynx] : the oropharynx was normal [Neck Appearance] : the appearance of the neck was normal [Neck Cervical Mass (___cm)] : no neck mass was observed [Jugular Venous Distention Increased] : there was no jugular-venous distention [Thyroid Diffuse Enlargement] : the thyroid was not enlarged [Thyroid Nodule] : there were no palpable thyroid nodules [Auscultation Breath Sounds / Voice Sounds] : lungs were clear to auscultation bilaterally [Heart Rate And Rhythm] : heart rate was normal and rhythm regular [Heart Sounds Gallop] : no gallops [Heart Sounds] : normal S1 and S2 [Murmurs] : no murmurs [Heart Sounds Pericardial Friction Rub] : no pericardial rub [Full Pulse] : the pedal pulses are present [Edema] : there was no peripheral edema [Bowel Sounds] : normal bowel sounds [Abdomen Soft] : soft [Abdomen Tenderness] : non-tender [Abdomen Mass (___ Cm)] : no abdominal mass palpated [No CVA Tenderness] : no ~M costovertebral angle tenderness [No Spinal Tenderness] : no spinal tenderness [Nail Clubbing] : no clubbing  or cyanosis of the fingernails [Musculoskeletal - Swelling] : no joint swelling seen [Motor Tone] : muscle strength and tone were normal [FreeTextEntry1] : Instability [Skin Color & Pigmentation] : normal skin color and pigmentation [Skin Turgor] : normal skin turgor [] : no rash

## 2020-08-24 ENCOUNTER — RX RENEWAL (OUTPATIENT)
Age: 75
End: 2020-08-24

## 2020-10-01 ENCOUNTER — APPOINTMENT (OUTPATIENT)
Dept: ORTHOPEDIC SURGERY | Facility: CLINIC | Age: 75
End: 2020-10-01
Payer: MEDICARE

## 2020-10-01 VITALS
DIASTOLIC BLOOD PRESSURE: 80 MMHG | WEIGHT: 150 LBS | BODY MASS INDEX: 26.58 KG/M2 | TEMPERATURE: 98 F | HEART RATE: 69 BPM | HEIGHT: 63 IN | SYSTOLIC BLOOD PRESSURE: 136 MMHG

## 2020-10-01 PROCEDURE — 99203 OFFICE O/P NEW LOW 30 MIN: CPT | Mod: 25

## 2020-10-01 PROCEDURE — 20610 DRAIN/INJ JOINT/BURSA W/O US: CPT | Mod: RT

## 2020-10-05 NOTE — PHYSICAL EXAM
[de-identified] : Left Knee: \par Range of Motion in Degrees	\par 	                  Claimant:	Normal:	\par Flexion Active	  135 	                135-degrees	\par Flexion Passive	  135	                135-degrees	\par Extension Active	  0-5	                0-5-degrees	\par Extension Passive	  0-5	                0-5-degrees	\par \par No weakness to flexion/extension.  No evidence of instability in the AP plane or varus or valgus stress.  Negative  Lachman.  Negative pivot shift.  Negative anterior drawer test.  Negative posterior drawer test.  Negative Soco.  Negative Apley grind.  No medial or lateral joint line tenderness.  No tenderness over the medial and lateral facet of the patella.  No patellofemoral crepitations.  No lateral tilting patella.  No patellar apprehension.  No crepitation in the medial and lateral femoral condyle.  No proximal or distal swelling, edema or tenderness.  No gross motor or sensory deficits.  No intra-articular swelling.  2+ DP and PT pulses. No varus or valgus malalignment.  Skin is intact.  No rashes, scars or lesions. \par \par Right Knee: \par Range of Motion in Degrees	\par 	                  Claimant:	Normal:	\par Flexion Active	  135 	               135-degrees	\par Flexion Passive	  135	               135-degrees	\par Extension Active	  0-5	               0-5-degrees	\par Extension Passive     0-5	               0-5-degrees	\par \par No weakness to flexion/extension.  No evidence of instability in the AP plane or varus or valgus stress.  Negative  Lachman.  Negative pivot shift.  Negative anterior drawer test.  Negative posterior drawer test.  Positive Soco.  Positive Apley grind.  Positive medial joint line tenderness.  Negative lateral joint line tenderness.  Positive tenderness over the medial and lateral facet of the patella.  Positive patellofemoral crepitations.  No lateral tilting patella.  No patellar apprehension.  Positive crepitation in the medial and lateral femoral condyle.  No proximal or distal swelling, edema or tenderness.  No gross motor or sensory deficits.  Moderate effusion.  2+ DP and PT pulses.  No varus or valgus malalignment.  Skin is intact.  No rashes, scars or lesions. \par   [de-identified] : Ambulating with a slightly antalgic to antalgic gait.  Station:  Normal.  [de-identified] : Appearance:  Well-developed, well-nourished female in no acute distress.\par \par   [de-identified] : Radiographs, two views of the right knee, show moderate degenerative changes.

## 2020-10-05 NOTE — HISTORY OF PRESENT ILLNESS
[3] : the ailment interference is 3/10 [(Does not interfere) 0] : the ailment interference is 0/10 (does not interfere) [2] : the ailment interference is 2/10 [de-identified] : The patient comes in today with complaints of pain to her right knee.  It started atraumatically about one month ago and it is getting a little worse recently. The patient states the onset/injury occurred in September of 2020. The patient states the pain is constant and radiating.  The patient describes the pain as dull.  The patient notes that rest makes her symptoms better.  The patient indicates a pain level of 5 on a pain scale of 0-10.  [] : No

## 2020-10-05 NOTE — ADDENDUM
[FreeTextEntry1] : This note was written by Pearl Smith on 10/05/2020 acting as a scribe for MONE GARCIA III, MD

## 2020-10-05 NOTE — PROCEDURE
[de-identified] : Consent: \par At this time, I have recommended an injection to the right knee.  The risks and benefits of the procedure were discussed with the patient in detail.  Upon verbal consent of the patient, we proceeded with the injection as noted below.  \par \par Procedure:  \par After a sterile prep, the patient underwent an injection of 9 cc of 1% Lidocaine without epinephrine and 1 cc of Kenalog into the right knee.  The patient tolerated the procedure well.  There were no complications.  \par

## 2020-10-05 NOTE — DISCUSSION/SUMMARY
[de-identified] : At this time, due to osteoarthritis with medial meniscus tear of the right knee, I recommended ice and elevation.  She will be reassessed in three to four weeks.

## 2020-10-05 NOTE — CONSULT LETTER
[Dear  ___] : Dear  [unfilled], [Consult Letter:] : I had the pleasure of evaluating your patient, [unfilled]. [Please see my note below.] : Please see my note below. [Consult Closing:] : Thank you very much for allowing me to participate in the care of this patient.  If you have any questions, please do not hesitate to contact me. [Sincerely,] : Sincerely, [FreeTextEntry3] : Pino Sheppard III, MD\par [facility]/[place]

## 2020-10-19 ENCOUNTER — APPOINTMENT (OUTPATIENT)
Dept: ORTHOPEDIC SURGERY | Facility: CLINIC | Age: 75
End: 2020-10-19
Payer: MEDICARE

## 2020-10-19 VITALS
TEMPERATURE: 96.6 F | WEIGHT: 150 LBS | SYSTOLIC BLOOD PRESSURE: 134 MMHG | BODY MASS INDEX: 26.58 KG/M2 | HEIGHT: 63 IN | HEART RATE: 73 BPM | DIASTOLIC BLOOD PRESSURE: 87 MMHG

## 2020-10-19 PROCEDURE — 99213 OFFICE O/P EST LOW 20 MIN: CPT

## 2020-10-20 NOTE — ADDENDUM
[FreeTextEntry1] : This note was written by Pearl Smith on 10/20/2020 acting as a scribe for MONE GARCIA III, MD

## 2020-10-20 NOTE — PHYSICAL EXAM
[de-identified] : Ambulating with a slightly antalgic to antalgic gait.  Station:  Normal.  [de-identified] : Right Knee: \par Range of Motion in Degrees	\par 	                  Claimant:	Normal:	\par Flexion Active	  135 	                135-degrees	\par Flexion Passive	  135	                135-degrees	\par Extension Active	  0-5	                0-5-degrees	\par Extension Passive	  0-5	                0-5-degrees	\par \par No weakness to flexion/extension.  No evidence of instability in the AP plane or varus or valgus stress.  Negative  Lachman.  Negative pivot shift.  Negative anterior drawer test.  Negative posterior drawer test.  Negative Soco.  Negative Apley grind.  No medial or lateral joint line tenderness.  Positive tenderness over the medial and lateral facet of the patella.  Positive patellofemoral crepitations.  No lateral tilting patella.  No patella apprehension.  Positive crepitation in the medial and lateral femoral condyle.  No proximal or distal swelling, edema or tenderness.  No gross motor or sensory deficits.  Mild intra-articular swelling.  2+ DP and PT pulses.  No varus or valgus malalignment.  Skin is intact.  No rashes, scars or lesions.  \par   [de-identified] : Appearance:  Well-developed, well-nourished female in no acute distress.\par

## 2020-10-20 NOTE — DISCUSSION/SUMMARY
[de-identified] : At this time, due to right knee osteoarthritis, the patient is doing well.  She is instructed in home therapeutic modalities for the knee.  As far as her back, she is referred for a spine evaluation.

## 2020-10-20 NOTE — CONSULT LETTER
[Dear  ___] : Dear  [unfilled], [Referral Closing:] : Thank you very much for seeing this patient.  If you have any questions, please do not hesitate to contact me. [Referral Letter:] : I am referring [unfilled] to you for further evaluation.  My most recent evaluation follows. [Sincerely,] : Sincerely, [FreeTextEntry3] : Pino Sheppard III, MD\par [facility]/[place]

## 2020-10-20 NOTE — HISTORY OF PRESENT ILLNESS
[de-identified] : The patient comes in today for her right knee, stating her knee feels great.  She is having some recurrence of sciatic type complaints she has had in the past.

## 2020-10-22 ENCOUNTER — APPOINTMENT (OUTPATIENT)
Dept: NEUROLOGY | Facility: CLINIC | Age: 75
End: 2020-10-22
Payer: MEDICARE

## 2020-10-22 VITALS
HEART RATE: 78 BPM | HEIGHT: 63 IN | DIASTOLIC BLOOD PRESSURE: 83 MMHG | SYSTOLIC BLOOD PRESSURE: 123 MMHG | BODY MASS INDEX: 25.16 KG/M2 | TEMPERATURE: 97.5 F | WEIGHT: 142 LBS

## 2020-10-22 PROCEDURE — 99214 OFFICE O/P EST MOD 30 MIN: CPT

## 2020-10-22 NOTE — DATA REVIEWED
[de-identified] : 8 mm enhancing extra axial mass in Rt frontal convexity consistent with partiially calcified Meningioma.\par Minimal microvascular changes [de-identified] : Mild bilat slow

## 2020-10-22 NOTE — REVIEW OF SYSTEMS
[Poor Coordination] : poor coordination [Difficulty Walking] : difficulty walking [Negative] : Heme/Lymph [de-identified] : Rest tremor\par rigidity\par Ch LBP

## 2020-10-22 NOTE — HISTORY OF PRESENT ILLNESS
[FreeTextEntry1] : She is 75-year-old patient coming here for followup evaluation for Parkinson symptoms left more than right side with rest tremors and cogwheel rigidity with postural instability workup including MRI and EEG done did not reveal an acute pathology with incidental right frontal meningioma. Started on Sinemet 25/100 one tablet 3 times a day with the titrated from low-dose.\par Symptoms improved significantly patient wanted DAVID scan which was not done yet  patient going for it in November.\par Had problems with her back and knee problems for which she was seen by orthopedic surgery received local steroid injections which helped her. Complaining about low back pain but which she sees pain management.\par Patient does not want to increase the doses of Sinemet medication at this time. No other new complaints. Persistent tremors even though improved.\par

## 2020-10-22 NOTE — PHYSICAL EXAM
[General Appearance - Alert] : alert [General Appearance - In No Acute Distress] : in no acute distress [Oriented To Time, Place, And Person] : oriented to person, place, and time [Impaired Insight] : insight and judgment were intact [Affect] : the affect was normal [Person] : oriented to person [Place] : oriented to place [Time] : oriented to time [Concentration Intact] : normal concentrating ability [Visual Intact] : visual attention was ~T not ~L decreased [Naming Objects] : no difficulty naming common objects [Repeating Phrases] : no difficulty repeating a phrase [Writing A Sentence] : no difficulty writing a sentence [Fluency] : fluency intact [Comprehension] : comprehension intact [Reading] : reading intact [Past History] : adequate knowledge of personal past history [Cranial Nerves Optic (II)] : visual acuity intact bilaterally,  visual fields full to confrontation, pupils equal round and reactive to light [Cranial Nerves Oculomotor (III)] : extraocular motion intact [Cranial Nerves Trigeminal (V)] : facial sensation intact symmetrically [Cranial Nerves Facial (VII)] : face symmetrical [Cranial Nerves Vestibulocochlear (VIII)] : hearing was intact bilaterally [Cranial Nerves Glossopharyngeal (IX)] : tongue and palate midline [Cranial Nerves Accessory (XI - Cranial And Spinal)] : head turning and shoulder shrug symmetric [Cranial Nerves Hypoglossal (XII)] : there was no tongue deviation with protrusion [Motor Strength] : muscle strength was normal in all four extremities [No Muscle Atrophy] : normal bulk in all four extremities [Sensation Tactile Decrease] : light touch was intact [Limited Balance] : the patient's balance was impaired [Past-pointing] : there was no past-pointing [Tremor] : no tremor present [2+] : Brachioradialis left 2+ [1+] : Ankle jerk left 1+ [Plantar Reflex Right Only] : normal on the right [Plantar Reflex Left Only] : normal on the left [FreeTextEntry6] : Swaying to Rt side when sitting [FreeTextEntry8] : Postural instability [Sclera] : the sclera and conjunctiva were normal [PERRL With Normal Accommodation] : pupils were equal in size, round, reactive to light, with normal accommodation [Extraocular Movements] : extraocular movements were intact [Outer Ear] : the ears and nose were normal in appearance [Oropharynx] : the oropharynx was normal [Neck Appearance] : the appearance of the neck was normal [Neck Cervical Mass (___cm)] : no neck mass was observed [Jugular Venous Distention Increased] : there was no jugular-venous distention [Thyroid Diffuse Enlargement] : the thyroid was not enlarged [Thyroid Nodule] : there were no palpable thyroid nodules [Auscultation Breath Sounds / Voice Sounds] : lungs were clear to auscultation bilaterally [Heart Rate And Rhythm] : heart rate was normal and rhythm regular [Heart Sounds] : normal S1 and S2 [Heart Sounds Gallop] : no gallops [Murmurs] : no murmurs [Heart Sounds Pericardial Friction Rub] : no pericardial rub [Full Pulse] : the pedal pulses are present [Edema] : there was no peripheral edema [Bowel Sounds] : normal bowel sounds [Abdomen Soft] : soft [Abdomen Tenderness] : non-tender [Abdomen Mass (___ Cm)] : no abdominal mass palpated [No CVA Tenderness] : no ~M costovertebral angle tenderness [No Spinal Tenderness] : no spinal tenderness [Nail Clubbing] : no clubbing  or cyanosis of the fingernails [Musculoskeletal - Swelling] : no joint swelling seen [Motor Tone] : muscle strength and tone were normal [FreeTextEntry1] : Instability [Skin Color & Pigmentation] : normal skin color and pigmentation [Skin Turgor] : normal skin turgor [] : no rash

## 2020-10-22 NOTE — DISCUSSION/SUMMARY
[FreeTextEntry1] : Patient with a symptoms of Parkinson disease with rest tremors with cogwheel rigidity and postural instability left more than right side symptoms improved since started on Sinemet but still persisting symptoms.\par At this time patient or not wanting it is the dose of medication.\par Continue Sinemet 25/100 one tablet 3 times a day.\par DAVID  scan pending.\par Recommend patient to have a followup MRI scan for meningioma to be done in December. \par Followup evaluation in January.\par Follow up with you further medical problems.\par Patient education provided her current medications and side effects.\par \par \par

## 2020-10-22 NOTE — REASON FOR VISIT
[Follow-Up: _____] : a [unfilled] follow-up visit [FreeTextEntry1] : Folow up fro pt with Parkinson disease left > Rt with Incidental Meningioma

## 2020-12-08 ENCOUNTER — APPOINTMENT (OUTPATIENT)
Dept: NUCLEAR MEDICINE | Facility: CLINIC | Age: 75
End: 2020-12-08
Payer: MEDICARE

## 2020-12-08 ENCOUNTER — OUTPATIENT (OUTPATIENT)
Dept: OUTPATIENT SERVICES | Facility: HOSPITAL | Age: 75
LOS: 1 days | End: 2020-12-08

## 2020-12-08 ENCOUNTER — RESULT REVIEW (OUTPATIENT)
Age: 75
End: 2020-12-08

## 2020-12-08 DIAGNOSIS — Z98.890 OTHER SPECIFIED POSTPROCEDURAL STATES: Chronic | ICD-10-CM

## 2020-12-08 DIAGNOSIS — G25.2 OTHER SPECIFIED FORMS OF TREMOR: ICD-10-CM

## 2020-12-08 DIAGNOSIS — Z90.89 ACQUIRED ABSENCE OF OTHER ORGANS: Chronic | ICD-10-CM

## 2020-12-08 DIAGNOSIS — S82.899A OTHER FRACTURE OF UNSPECIFIED LOWER LEG, INITIAL ENCOUNTER FOR CLOSED FRACTURE: Chronic | ICD-10-CM

## 2020-12-08 DIAGNOSIS — H26.9 UNSPECIFIED CATARACT: Chronic | ICD-10-CM

## 2020-12-08 PROCEDURE — 78803 RP LOCLZJ TUM SPECT 1 AREA: CPT | Mod: 26

## 2020-12-15 ENCOUNTER — NON-APPOINTMENT (OUTPATIENT)
Age: 75
End: 2020-12-15

## 2021-01-20 ENCOUNTER — APPOINTMENT (OUTPATIENT)
Dept: NEUROLOGY | Facility: CLINIC | Age: 76
End: 2021-01-20
Payer: MEDICARE

## 2021-01-20 PROCEDURE — 99214 OFFICE O/P EST MOD 30 MIN: CPT

## 2021-01-20 NOTE — DATA REVIEWED
[de-identified] : 8 mm enhancing extra axial mass in Rt frontal convexity consistent with partiially calcified Meningioma.\par Minimal microvascular changes [de-identified] : Mild bilat slow [de-identified] : DAVID scan shows decreased activity in Bilat Caudate nucleus RT > left c/w Parkinson disease

## 2021-01-20 NOTE — REVIEW OF SYSTEMS
[Poor Coordination] : poor coordination [Difficulty Walking] : difficulty walking [Negative] : Heme/Lymph [de-identified] : Rest tremor\par rigidity\par Ch LBP

## 2021-01-20 NOTE — HISTORY OF PRESENT ILLNESS
[FreeTextEntry1] : She is 75-year-old patient coming here for followup evaluation for Parkinson disease asymmetric onset left side more than right side with improved symptoms on Sinemet 25/100 half tablet twice a day and one tablet at nighttime with incidental right frontal meningioma recently had DAVID scan to confirm the diagnosis of Parkinson diagnosis and NM spect DAVID confirms the diagnosis.\par \par Patient feeling better still complaining about arthralgias and joint pains especially knee and hip pain currently she is under the care of Dr. Sheppard. No other new complaints tremor still bothering her cogwheel rigidity and gait  imbalance improved.

## 2021-01-20 NOTE — DISCUSSION/SUMMARY
[address] [facility]  [place], [address]   Phone [phone]       ED COURSE / MDM:  41-year-old female with history of PCOS, on birth control pills, history of surgery once for ovarian cyst with several week history of malodorous brown vaginal discharge. She states she has had some intermittent bilateral pelvic pain for the last 7 to 10 days but does not have pain now. No UTI symptoms or flank pain. She is afebrile. Abdomen is benign. Pelvic exam was deferred by the patient. Urinalysis was normal and UCG negative. Wet prep was positive only for clue cells. DNA probe was sent and is pending. Patient states she is not particularly concerned about gonorrhea or chlamydia and will wait for those results before electing to be treated. She was given a prescription for Flagyl to take for BV and Naprosyn to take if needed for pain. She appears quite comfortable here. Clinically there is no evidence for TOA, torsion, surgical abdomen or ectopic pregnancy. Given her history of PCOS, she may have a small cyst but abdomen is completely nontender. I recommended follow-up with her gynecologist as scheduled on Tuesday. I discussed with Daily Majano the results of evaluation in the Emergency Department, diagnosis, care and prognosis. The plan is to discharge to home. The patient is in agreement with the plan and questions have been answered. I also discussed with the patient and/or family the reasons which may require a return visit and the importance of follow-up care.        (Please note that portions of this note may have been completed with a voice recognition program.  Efforts were made to edit the dictation but occasionally words are mis-transcribed)        FINAL IMPRESSION:  1 --bacterial vaginosis  2 --PCOS                   Chema Contreras MD  05/30/20 [id number]
[FreeTextEntry1] : Patient with asymmetric Parkinson disease right more than left side with cogwheel rigidity and postural instability improved since started on Sinemet with persistent tremor on the left side.\par Complaining about arthralgias and joint pains.\par Incidental right frontal meningioma.\par Patient had DAVID scan consistent with Parkinson disease.\par Recommend continue Sinemet 25/100 one tablet 3 times a day patient is noncompliant with medications.\par Recommend exercises which patient is working regularly.\par Followup MRI scan pinning in June. Follow up for meningioma.\par Recommend follow up with Dr. Sheppard for hip and knee pain.\par  Follow up with you for other medical problems.\par Patient education provided regarding medications and side effects.\par Return for a followup evaluation in June after a followup MRI scan is done.\par

## 2021-01-20 NOTE — REASON FOR VISIT
[Follow-Up: _____] : a [unfilled] follow-up visit [FreeTextEntry1] : Follow up for pt with PD  with incidental Meningioma

## 2021-03-15 ENCOUNTER — RX RENEWAL (OUTPATIENT)
Age: 76
End: 2021-03-15

## 2021-04-27 ENCOUNTER — APPOINTMENT (OUTPATIENT)
Dept: ORTHOPEDIC SURGERY | Facility: CLINIC | Age: 76
End: 2021-04-27
Payer: MEDICARE

## 2021-04-27 VITALS
TEMPERATURE: 98 F | SYSTOLIC BLOOD PRESSURE: 160 MMHG | HEIGHT: 63 IN | WEIGHT: 137 LBS | HEART RATE: 65 BPM | DIASTOLIC BLOOD PRESSURE: 90 MMHG | BODY MASS INDEX: 24.27 KG/M2

## 2021-04-27 PROCEDURE — 73502 X-RAY EXAM HIP UNI 2-3 VIEWS: CPT | Mod: 26,LT

## 2021-04-27 PROCEDURE — 20610 DRAIN/INJ JOINT/BURSA W/O US: CPT | Mod: RT

## 2021-04-27 PROCEDURE — 99214 OFFICE O/P EST MOD 30 MIN: CPT | Mod: 25

## 2021-04-29 NOTE — PROCEDURE
[de-identified] : Consent: \par At this time, I have recommended an injection to the right knee.  The risks and benefits of the procedure were discussed with the patient in detail.  Upon verbal consent of the patient, we proceeded with the injection as noted below.  \par \par Procedure:  \par After a sterile prep, the patient underwent an injection of 9 cc of 1% Lidocaine without epinephrine and 1 cc of Kenalog into the right knee.  The patient tolerated the procedure well.  There were no complications.  \par

## 2021-04-29 NOTE — HISTORY OF PRESENT ILLNESS
[de-identified] : The patient comes in today for right knee pain and left hip pain.  She states that she had an injection into the right knee back in October and now it is starting to act up again, mostly medial joint line pain.  On the left hip, she has groin pain.  She states that it is a pulling feeling and she has trouble with internal and external rotation.

## 2021-04-29 NOTE — ADDENDUM
[FreeTextEntry1] : This note was written by Pearl Smith on 04/29/2021 acting as a scribe for MONE GARCIA III, MD

## 2021-04-29 NOTE — PHYSICAL EXAM
[de-identified] : Right Knee: \par Range of Motion in Degrees	\par 	                  Claimant:	Normal:	\par Flexion Active	  135 	                135-degrees	\par Flexion Passive	  135	                135-degrees	\par Extension Active	  0-5	                0-5-degrees	\par Extension Passive	  0-5	                0-5-degrees	\par \par No weakness to flexion/extension.  No evidence of instability in the AP plane or varus or valgus stress.  Negative  Lachman.  Negative pivot shift.  Negative anterior drawer test.  Negative posterior drawer test.  Negative Soco.  Negative Apley grind.  No medial or lateral joint line tenderness.  Positive tenderness over the medial and lateral facet of the patella.  Positive patellofemoral crepitations.  No lateral tilting patella.  No patella apprehension.  Positive crepitation in the medial and lateral femoral condyle.  No proximal or distal swelling, edema or tenderness.  No gross motor or sensory deficits.  Mild intra-articular swelling.  2+ DP and PT pulses.  No varus or valgus malalignment.  Skin is intact.  No rashes, scars or lesions.  \par \par Left Hip: \par Range of Motion in Degrees:\par 	                                  Claimant:	Normal:	\par Flexion (Active) 	                  120 	                120-degrees	\par Flexion (Passive)	                  120	                120-degrees	\par Extension (Active)	                  -30	                -30-degrees	\par Extension (Passive)	  -30	                -30-degrees	\par Abduction (Active)	                  45-50	                [id number]	\par Abduction (Passive)	  45-50	                [id number]	\par Adduction (Active)	                  20-30	                [id number]	\par Adduction (Passive)	  20-30	                [id number]	\par Internal Rotation (Active) 	 35	                35-degrees	\par Internal Rotation (Passive)	 35	                35-degrees	\par External Rotation (Active)	 45	                45-degrees	\par External Rotation (Passive)	 45	                45-degrees	\par \par Tenderness into the groin with internal and external rotation and axial load.  No tenderness to palpation over the greater trochanter.  Negative Trendelenburg.  No tenderness with resisted abduction.  No weakness to flexion, extension, abduction or adduction.  No evidence of instability.  No motor or sensory deficits.  2+ DP and PT pulses.  Skin is intact.  No scars, rashes or lesions.  \par   [de-identified] : Ambulating with a slightly antalgic to antalgic gait.  Station:  Normal.  [de-identified] : Appearance:  Well-developed, well-nourished female in no acute distress.\par \par  [de-identified] : Radiographs, two-three views of the left hip, including the pelvis, reveals arthritis of the left hip.\par

## 2021-04-29 NOTE — DISCUSSION/SUMMARY
[de-identified] : At this time, due to osteoarthritis of the right knee, the patient was advised to ice.  As far as the osteoarthritis of the left hip, the patient will do physical therapy.  She will return to the office in four weeks.

## 2021-05-25 ENCOUNTER — APPOINTMENT (OUTPATIENT)
Dept: ORTHOPEDIC SURGERY | Facility: CLINIC | Age: 76
End: 2021-05-25
Payer: MEDICARE

## 2021-05-25 ENCOUNTER — NON-APPOINTMENT (OUTPATIENT)
Age: 76
End: 2021-05-25

## 2021-05-25 VITALS
HEART RATE: 67 BPM | SYSTOLIC BLOOD PRESSURE: 127 MMHG | DIASTOLIC BLOOD PRESSURE: 83 MMHG | WEIGHT: 131 LBS | BODY MASS INDEX: 23.21 KG/M2 | TEMPERATURE: 97.8 F | HEIGHT: 63 IN

## 2021-05-25 PROCEDURE — 99213 OFFICE O/P EST LOW 20 MIN: CPT

## 2021-05-27 NOTE — HISTORY OF PRESENT ILLNESS
[de-identified] : The patient comes in today stating she has been doing physical therapy and she is doing excellent.  She states she is at least 75% better.

## 2021-05-27 NOTE — PHYSICAL EXAM
[de-identified] : Left Hip:\par Hip: Range of Motion in Degrees:\par 	                                  Claimant:	Normal:	\par Flexion (Active) 	                  120 	                120-degrees	\par Flexion (Passive)	                  120	                120-degrees	\par Extension (Active)	                  -30	                -30-degrees	\par Extension (Passive)	  -30	                -30-degrees	\par Abduction (Active)	                  45-50	                14-95-[id number]	\par Abduction (Passive)	  45-50	                [id number]	\par Adduction (Active)	                  20-30	                [id number]	\par Adduction (Passive)	  20-30	                [id number]	\par Internal Rotation (Active) 	 35	                35-degrees	\par Internal Rotation (Passive)	 35	                35-degrees	\par External Rotation (Active)	 45	                45-degrees	\par External Rotation (Passive)	 45	                45-degrees	\par \par Tenderness into the groin with internal and external rotation and axial load.  No tenderness to palpation over the greater trochanter.  Negative Trendelenburg.  No tenderness with resisted abduction.  No weakness to flexion, extension, abduction or adduction.  No evidence of instability.  No motor or sensory deficits.  2+ DP and PT pulses.  Skin is intact.  No scars, rashes or lesions.  \par \par Right Knee:\par Knee: Range of Motion in Degrees	\par 	                  Claimant:	Normal:	\par Flexion Active	  135 	                135-degrees	\par Flexion Passive	  135	                135-degrees	\par Extension Active	  0-5	                0-5-degrees	\par Extension Passive	  0-5	                0-5-degrees	\par \par No weakness to flexion/extension.  No evidence of instability in the AP plane or varus or valgus stress.  Negative  Lachman.  Negative pivot shift.  Negative anterior drawer test.  Negative posterior drawer test.  Negative Soco.  Negative Apley grind.  No medial or lateral joint line tenderness.  Positive tenderness over the medial and lateral facet of the patella.  Positive patellofemoral crepitations.  No lateral tilting patella.  No patella apprehension.  Positive crepitation in the medial and lateral femoral condyle.  No proximal or distal swelling, edema or tenderness.  No gross motor or sensory deficits.  Mild intra-articular swelling.  2+ DP and PT pulses.  No varus or valgus malalignment.  Skin is intact.  No rashes, scars or lesions.  \par    [de-identified] : Gait and Station:  Ambulating with a slightly antalgic to antalgic gait.  Station:  Normal.  [de-identified] : Appearance:  Well-developed, well-nourished female in no acute distress.\par

## 2021-05-27 NOTE — DISCUSSION/SUMMARY
[de-identified] : At this time, the patient will continue physical therapy for her osteoarthritis of the left hip and osteoarthritis of the right knee.  We will do a telehealth in six weeks.

## 2021-05-27 NOTE — ADDENDUM
[FreeTextEntry1] : This note was written by Yoly Santana on 05/27/2021 acting as scribe for Mariella Roberts, OTR/L, PA

## 2021-05-28 ENCOUNTER — RX RENEWAL (OUTPATIENT)
Age: 76
End: 2021-05-28

## 2021-06-25 ENCOUNTER — APPOINTMENT (OUTPATIENT)
Dept: NEUROLOGY | Facility: CLINIC | Age: 76
End: 2021-06-25
Payer: MEDICARE

## 2021-06-25 VITALS
SYSTOLIC BLOOD PRESSURE: 128 MMHG | TEMPERATURE: 97.2 F | DIASTOLIC BLOOD PRESSURE: 84 MMHG | HEIGHT: 63 IN | BODY MASS INDEX: 23.92 KG/M2 | WEIGHT: 135 LBS | HEART RATE: 71 BPM

## 2021-06-25 DIAGNOSIS — R90.89 OTHER ABNORMAL FINDINGS ON DIAGNOSTIC IMAGING OF CENTRAL NERVOUS SYSTEM: ICD-10-CM

## 2021-06-25 PROCEDURE — 99213 OFFICE O/P EST LOW 20 MIN: CPT

## 2021-06-25 NOTE — REASON FOR VISIT
[Follow-Up: _____] : a [unfilled] follow-up visit [FreeTextEntry1] : Follow up for pt with PD and Meningioma asymptomatic

## 2021-06-25 NOTE — PHYSICAL EXAM
[General Appearance - Alert] : alert [General Appearance - In No Acute Distress] : in no acute distress [Oriented To Time, Place, And Person] : oriented to person, place, and time [Impaired Insight] : insight and judgment were intact [Affect] : the affect was normal [Person] : oriented to person [Place] : oriented to place [Time] : oriented to time [Concentration Intact] : normal concentrating ability [Visual Intact] : visual attention was ~T not ~L decreased [Naming Objects] : no difficulty naming common objects [Repeating Phrases] : no difficulty repeating a phrase [Writing A Sentence] : no difficulty writing a sentence [Fluency] : fluency intact [Comprehension] : comprehension intact [Reading] : reading intact [Past History] : adequate knowledge of personal past history [Cranial Nerves Optic (II)] : visual acuity intact bilaterally,  visual fields full to confrontation, pupils equal round and reactive to light [Cranial Nerves Oculomotor (III)] : extraocular motion intact [Cranial Nerves Trigeminal (V)] : facial sensation intact symmetrically [Cranial Nerves Facial (VII)] : face symmetrical [Cranial Nerves Vestibulocochlear (VIII)] : hearing was intact bilaterally [Cranial Nerves Glossopharyngeal (IX)] : tongue and palate midline [Cranial Nerves Accessory (XI - Cranial And Spinal)] : head turning and shoulder shrug symmetric [Cranial Nerves Hypoglossal (XII)] : there was no tongue deviation with protrusion [Motor Strength] : muscle strength was normal in all four extremities [No Muscle Atrophy] : normal bulk in all four extremities [Sensation Tactile Decrease] : light touch was intact [Limited Balance] : the patient's balance was impaired [Past-pointing] : there was no past-pointing [Tremor] : no tremor present [2+] : Brachioradialis left 2+ [1+] : Ankle jerk left 1+ [Plantar Reflex Right Only] : normal on the right [Plantar Reflex Left Only] : normal on the left [FreeTextEntry6] : Swaying to Rt side when sitting [FreeTextEntry8] : Postural instability [Sclera] : the sclera and conjunctiva were normal [PERRL With Normal Accommodation] : pupils were equal in size, round, reactive to light, with normal accommodation [Extraocular Movements] : extraocular movements were intact [Outer Ear] : the ears and nose were normal in appearance [Oropharynx] : the oropharynx was normal [Neck Appearance] : the appearance of the neck was normal [Neck Cervical Mass (___cm)] : no neck mass was observed [Thyroid Diffuse Enlargement] : the thyroid was not enlarged [Jugular Venous Distention Increased] : there was no jugular-venous distention [Thyroid Nodule] : there were no palpable thyroid nodules [Auscultation Breath Sounds / Voice Sounds] : lungs were clear to auscultation bilaterally [Heart Rate And Rhythm] : heart rate was normal and rhythm regular [Heart Sounds] : normal S1 and S2 [Heart Sounds Gallop] : no gallops [Murmurs] : no murmurs [Heart Sounds Pericardial Friction Rub] : no pericardial rub [Full Pulse] : the pedal pulses are present [Edema] : there was no peripheral edema [Abdomen Soft] : soft [Bowel Sounds] : normal bowel sounds [Abdomen Tenderness] : non-tender [Abdomen Mass (___ Cm)] : no abdominal mass palpated [No CVA Tenderness] : no ~M costovertebral angle tenderness [No Spinal Tenderness] : no spinal tenderness [Nail Clubbing] : no clubbing  or cyanosis of the fingernails [Musculoskeletal - Swelling] : no joint swelling seen [Motor Tone] : muscle strength and tone were normal [FreeTextEntry1] : Instability [Skin Color & Pigmentation] : normal skin color and pigmentation [Skin Turgor] : normal skin turgor [] : no rash

## 2021-06-25 NOTE — DATA REVIEWED
[de-identified] : 8 mm enhancing extra axial mass in Rt frontal convexity consistent with partiially calcified Meningioma.\par Minimal microvascular changes\par Repeat MRI No change [de-identified] : Mild bilat slow [de-identified] : DAVID scan shows decreased activity in Bilat Caudate nucleus RT > left c/w Parkinson disease

## 2021-06-25 NOTE — HISTORY OF PRESENT ILLNESS
[FreeTextEntry1] : She is 76 year-old patient with Parkinson disease left more than right side symptoms currently taking Sinemet 25/100 one tablet 3 times a day stable neurologically offers no new complaints. Persistent rest tremors left more than right side. Tolerating medications without. Underlying osteoarthritis and DJD. MRI scan positive for incidental right parietal meningioma\par Repeat MRI scan unchanged report. No headaches no dizziness no focal complaints. Marked improvement with increasing the dose of Sinemet.\par Exercises regularly. No other new complaints since last visit. No recent lab work is available. Feels tired.

## 2021-06-25 NOTE — DISCUSSION/SUMMARY
[FreeTextEntry1] : Patient with asymmetric onset of Parkinson disease bilateral carpal rigidity and postural instability improved symptoms since dose of Sinemet was adjusted.\par Currently taking Sinemet 25/100 one tablet 3 times a day.\par Continue exercises as tolerated.\par Incidental right parietal meningioma unchanged.\par Follow up with you for other medical problems.\par Followup with orthopedics for knee problems.\par Return for reevaluation in 6 months as needed.\par Continue exercises as tolerated.\par Patient education provided regarding medications and adjust as needed.

## 2021-06-25 NOTE — REVIEW OF SYSTEMS
[Poor Coordination] : poor coordination [Difficulty Walking] : difficulty walking [Negative] : Heme/Lymph [de-identified] : Rest tremor\par rigidity\par Ch LBP

## 2021-06-29 ENCOUNTER — APPOINTMENT (OUTPATIENT)
Dept: ORTHOPEDIC SURGERY | Facility: CLINIC | Age: 76
End: 2021-06-29
Payer: MEDICARE

## 2021-06-29 DIAGNOSIS — M17.11 UNILATERAL PRIMARY OSTEOARTHRITIS, RIGHT KNEE: ICD-10-CM

## 2021-06-29 PROCEDURE — 99441: CPT | Mod: 95

## 2021-07-08 PROBLEM — M17.11 PRIMARY OSTEOARTHRITIS OF RIGHT KNEE: Status: ACTIVE | Noted: 2020-10-01

## 2021-08-12 ENCOUNTER — RX RENEWAL (OUTPATIENT)
Age: 76
End: 2021-08-12

## 2021-10-18 ENCOUNTER — RESULT CHARGE (OUTPATIENT)
Age: 76
End: 2021-10-18

## 2021-10-19 ENCOUNTER — APPOINTMENT (OUTPATIENT)
Dept: ORTHOPEDIC SURGERY | Facility: CLINIC | Age: 76
End: 2021-10-19
Payer: MEDICARE

## 2021-10-19 VITALS
BODY MASS INDEX: 24.8 KG/M2 | HEART RATE: 80 BPM | WEIGHT: 140 LBS | SYSTOLIC BLOOD PRESSURE: 149 MMHG | HEIGHT: 63 IN | DIASTOLIC BLOOD PRESSURE: 91 MMHG

## 2021-10-19 DIAGNOSIS — M16.12 UNILATERAL PRIMARY OSTEOARTHRITIS, LEFT HIP: ICD-10-CM

## 2021-10-19 PROCEDURE — 73502 X-RAY EXAM HIP UNI 2-3 VIEWS: CPT | Mod: LT

## 2021-10-19 PROCEDURE — 99213 OFFICE O/P EST LOW 20 MIN: CPT

## 2021-10-21 VITALS — WEIGHT: 140 LBS | BODY MASS INDEX: 24.8 KG/M2 | HEIGHT: 63 IN

## 2021-10-21 NOTE — ADDENDUM
[FreeTextEntry1] : This note was written by Andria Arredondo on 10/21/2021 acting as scribe for Mariella Roberts, FERMINR/DIVINA, PA.\par

## 2021-10-21 NOTE — DISCUSSION/SUMMARY
[de-identified] : The patient presents with OA of the left hip.  The patient is going to get an intraarticular injection to the left hip.  She will return to the office after it.

## 2021-10-21 NOTE — PHYSICAL EXAM
[de-identified] : Left hip:\par She has significantly decreased internal and external rotation on exam. Tenderness into the groin with internal and external rotation and axial load.  No tenderness to palpation over the greater trochanter.  Negative Trendelenburg.  No tenderness with resisted abduction.  No weakness to flexion, extension, abduction or adduction.  No evidence of instability.  No motor or sensory deficits.  Skin is intact.  No scars, rashes or lesions.  \par  \par No calf tenderness.  Good distal pulses. [de-identified] : Gait: Slightly antalgic to antalgic gait.  Station: Normal  [de-identified] : X-ray examination, two to three views of the left hip, including pelvis, reveals moderate OA of the hip.

## 2021-10-22 RX ORDER — ALPRAZOLAM 0.5 MG/1
0.5 TABLET ORAL
Qty: 2 | Refills: 0 | Status: ACTIVE | COMMUNITY
Start: 2021-09-24

## 2021-10-26 ENCOUNTER — APPOINTMENT (OUTPATIENT)
Dept: ULTRASOUND IMAGING | Facility: CLINIC | Age: 76
End: 2021-10-26

## 2021-12-20 ENCOUNTER — APPOINTMENT (OUTPATIENT)
Dept: NEUROLOGY | Facility: CLINIC | Age: 76
End: 2021-12-20
Payer: MEDICARE

## 2021-12-20 VITALS
WEIGHT: 135 LBS | TEMPERATURE: 97.8 F | SYSTOLIC BLOOD PRESSURE: 133 MMHG | BODY MASS INDEX: 23.92 KG/M2 | HEART RATE: 65 BPM | DIASTOLIC BLOOD PRESSURE: 81 MMHG | HEIGHT: 63 IN

## 2021-12-20 DIAGNOSIS — G20 PARKINSON'S DISEASE: ICD-10-CM

## 2021-12-20 PROCEDURE — 99214 OFFICE O/P EST MOD 30 MIN: CPT

## 2021-12-20 RX ORDER — ANASTROZOLE 1 MG/1
TABLET ORAL
Refills: 0 | Status: DISCONTINUED | COMMUNITY
End: 2021-12-20

## 2021-12-20 RX ORDER — CARBIDOPA AND LEVODOPA 25; 100 MG/1; MG/1
25-100 TABLET ORAL
Qty: 360 | Refills: 2 | Status: ACTIVE | COMMUNITY
Start: 2020-07-16 | End: 1900-01-01

## 2021-12-20 RX ORDER — CYCLOBENZAPRINE HYDROCHLORIDE 10 MG/1
10 TABLET, FILM COATED ORAL
Qty: 1 | Refills: 2 | Status: ACTIVE | COMMUNITY
Start: 2021-12-20 | End: 1900-01-01

## 2021-12-20 NOTE — REVIEW OF SYSTEMS
[Poor Coordination] : poor coordination [Difficulty Walking] : difficulty walking [Negative] : Heme/Lymph [de-identified] : Rest tremor\par rigidity\par Ch LBP

## 2021-12-20 NOTE — DATA REVIEWED
[de-identified] : 8 mm enhancing extra axial mass in Rt frontal convexity consistent with partiially calcified Meningioma.\par Minimal microvascular changes\par Repeat MRI No change [de-identified] : Mild bilat slow [de-identified] : DAVID scan shows decreased activity in Bilat Caudate nucleus RT > left c/w Parkinson disease

## 2021-12-20 NOTE — PHYSICAL EXAM
[General Appearance - Alert] : alert [General Appearance - In No Acute Distress] : in no acute distress [Oriented To Time, Place, And Person] : oriented to person, place, and time [Impaired Insight] : insight and judgment were intact [Affect] : the affect was normal [Person] : oriented to person [Place] : oriented to place [Time] : oriented to time [Concentration Intact] : normal concentrating ability [Visual Intact] : visual attention was ~T not ~L decreased [Naming Objects] : no difficulty naming common objects [Repeating Phrases] : no difficulty repeating a phrase [Writing A Sentence] : no difficulty writing a sentence [Fluency] : fluency intact [Comprehension] : comprehension intact [Reading] : reading intact [Past History] : adequate knowledge of personal past history [Cranial Nerves Optic (II)] : visual acuity intact bilaterally,  visual fields full to confrontation, pupils equal round and reactive to light [Cranial Nerves Oculomotor (III)] : extraocular motion intact [Cranial Nerves Trigeminal (V)] : facial sensation intact symmetrically [Cranial Nerves Facial (VII)] : face symmetrical [Cranial Nerves Vestibulocochlear (VIII)] : hearing was intact bilaterally [Cranial Nerves Glossopharyngeal (IX)] : tongue and palate midline [Cranial Nerves Accessory (XI - Cranial And Spinal)] : head turning and shoulder shrug symmetric [Cranial Nerves Hypoglossal (XII)] : there was no tongue deviation with protrusion [Motor Strength] : muscle strength was normal in all four extremities [No Muscle Atrophy] : normal bulk in all four extremities [Sensation Tactile Decrease] : light touch was intact [Limited Balance] : the patient's balance was impaired [Past-pointing] : there was no past-pointing [Tremor] : no tremor present [2+] : Brachioradialis left 2+ [1+] : Ankle jerk left 1+ [Plantar Reflex Right Only] : normal on the right [Plantar Reflex Left Only] : normal on the left [FreeTextEntry6] : SLR + ve . left LE increased tone .\par SLR +ve. Increased tone  LLE with difficulty toe and heel walking  [FreeTextEntry8] : Postural instability [Sclera] : the sclera and conjunctiva were normal [PERRL With Normal Accommodation] : pupils were equal in size, round, reactive to light, with normal accommodation [Extraocular Movements] : extraocular movements were intact [Outer Ear] : the ears and nose were normal in appearance [Oropharynx] : the oropharynx was normal [Neck Appearance] : the appearance of the neck was normal [Neck Cervical Mass (___cm)] : no neck mass was observed [Jugular Venous Distention Increased] : there was no jugular-venous distention [Thyroid Diffuse Enlargement] : the thyroid was not enlarged [Thyroid Nodule] : there were no palpable thyroid nodules [Auscultation Breath Sounds / Voice Sounds] : lungs were clear to auscultation bilaterally [Heart Rate And Rhythm] : heart rate was normal and rhythm regular [Heart Sounds] : normal S1 and S2 [Heart Sounds Gallop] : no gallops [Murmurs] : no murmurs [Heart Sounds Pericardial Friction Rub] : no pericardial rub [Full Pulse] : the pedal pulses are present [Edema] : there was no peripheral edema [Bowel Sounds] : normal bowel sounds [Abdomen Soft] : soft [Abdomen Tenderness] : non-tender [Abdomen Mass (___ Cm)] : no abdominal mass palpated [No CVA Tenderness] : no ~M costovertebral angle tenderness [No Spinal Tenderness] : no spinal tenderness [Nail Clubbing] : no clubbing  or cyanosis of the fingernails [Musculoskeletal - Swelling] : no joint swelling seen [Motor Tone] : muscle strength and tone were normal [FreeTextEntry1] : Instability [Skin Color & Pigmentation] : normal skin color and pigmentation [Skin Turgor] : normal skin turgor [] : no rash

## 2021-12-20 NOTE — REASON FOR VISIT
[Follow-Up: _____] : a [unfilled] follow-up visit [FreeTextEntry1] : Follow up for Pt with PD and LBP radiating into Left LE with muscle cramps

## 2021-12-20 NOTE — DISCUSSION/SUMMARY
[FreeTextEntry1] : Patient with Parkinson disease left more than right sided symptoms but with the postural instability with superimposed chronic low back pain with recent acute exacerbation with a radiculopathy involving left lower extremity.\par Recommend MRI of the lumbar spine.\par Continue physical therapy.\par Trial with muscle relaxants cyclobenzaprine 10 mg at bedtime p.r.n. as needed.\par Continue Sinemet 25/100 one tablet 4 times a day.\par Followup with you for other medical problems.\par Will refer patient to pain management and physical therapy does not improve her symptoms.\par Followup evaluation in 3 months or as needed. \par Patient education provided and discussed with the patient.

## 2021-12-20 NOTE — HISTORY OF PRESENT ILLNESS
[FreeTextEntry1] : She is 76-year-old patient with a history of Parkinson disease left more than right side asymmetric onset currently taking Sinemet 25/100 one tablet 3-4 times a day with positive DAVID scan complaining about left lower extremity muscle spasms.\par Physical therapy helped her symptoms for knee problems and hip pain.\par Cogwheel rigidity improved rest tremor still bothering her left side complaining about recent exacerbation of low back pain radiating into left lower extremity and left leg intermittent muscle spasms in the foot.\elizabeth Seen by pain management 3 years ago with low back pain with radiculopathy for which she received TOÑO which improved her symptoms.\elizabeth Does not want to go to [place] for pain management or repeat scans.\elizabeth Coming here for evaluation.\elizabeth

## 2022-01-19 ENCOUNTER — NON-APPOINTMENT (OUTPATIENT)
Age: 77
End: 2022-01-19

## 2022-03-14 ENCOUNTER — APPOINTMENT (OUTPATIENT)
Dept: NEUROLOGY | Facility: CLINIC | Age: 77
End: 2022-03-14
Payer: MEDICARE

## 2022-03-14 VITALS
WEIGHT: 140 LBS | HEART RATE: 63 BPM | TEMPERATURE: 97.7 F | DIASTOLIC BLOOD PRESSURE: 87 MMHG | HEIGHT: 63 IN | BODY MASS INDEX: 24.8 KG/M2 | SYSTOLIC BLOOD PRESSURE: 148 MMHG

## 2022-03-14 DIAGNOSIS — G25.2 OTHER SPECIFIED FORMS OF TREMOR: ICD-10-CM

## 2022-03-14 DIAGNOSIS — D32.0 BENIGN NEOPLASM OF CEREBRAL MENINGES: ICD-10-CM

## 2022-03-14 DIAGNOSIS — R29.898 OTHER SYMPTOMS AND SIGNS INVOLVING THE MUSCULOSKELETAL SYSTEM: ICD-10-CM

## 2022-03-14 DIAGNOSIS — R29.3 ABNORMAL POSTURE: ICD-10-CM

## 2022-03-14 PROCEDURE — 99214 OFFICE O/P EST MOD 30 MIN: CPT

## 2022-03-14 NOTE — REASON FOR VISIT
[Follow-Up: _____] : a [unfilled] follow-up visit [FreeTextEntry1] : Follow up fro Pt with LBP, PD , Meningioma and C/o LLE muscle cramps with pain  for 2 months

## 2022-03-14 NOTE — PHYSICAL EXAM
[General Appearance - Alert] : alert [General Appearance - In No Acute Distress] : in no acute distress [Oriented To Time, Place, And Person] : oriented to person, place, and time [Impaired Insight] : insight and judgment were intact [Affect] : the affect was normal [Person] : oriented to person [Place] : oriented to place [Time] : oriented to time [Concentration Intact] : normal concentrating ability [Visual Intact] : visual attention was ~T not ~L decreased [Naming Objects] : no difficulty naming common objects [Repeating Phrases] : no difficulty repeating a phrase [Writing A Sentence] : no difficulty writing a sentence [Fluency] : fluency intact [Comprehension] : comprehension intact [Reading] : reading intact [Past History] : adequate knowledge of personal past history [Cranial Nerves Optic (II)] : visual acuity intact bilaterally,  visual fields full to confrontation, pupils equal round and reactive to light [Cranial Nerves Oculomotor (III)] : extraocular motion intact [Cranial Nerves Trigeminal (V)] : facial sensation intact symmetrically [Cranial Nerves Facial (VII)] : face symmetrical [Cranial Nerves Vestibulocochlear (VIII)] : hearing was intact bilaterally [Cranial Nerves Glossopharyngeal (IX)] : tongue and palate midline [Cranial Nerves Accessory (XI - Cranial And Spinal)] : head turning and shoulder shrug symmetric [Cranial Nerves Hypoglossal (XII)] : there was no tongue deviation with protrusion [Motor Strength] : muscle strength was normal in all four extremities [No Muscle Atrophy] : normal bulk in all four extremities [Sensation Tactile Decrease] : light touch was intact [Limited Balance] : the patient's balance was impaired [Past-pointing] : there was no past-pointing [Tremor] : no tremor present [2+] : Brachioradialis left 2+ [1+] : Ankle jerk left 1+ [Plantar Reflex Right Only] : normal on the right [Plantar Reflex Left Only] : normal on the left [FreeTextEntry6] : SLR + ve . left LE increased tone  left foot drop? difficulty with dorsiflexion 4/5 left foot\par SLR +ve. Increased tone  LLE with difficulty toe and heel walking  [FreeTextEntry8] : Unable to do heel walking  [Sclera] : the sclera and conjunctiva were normal [PERRL With Normal Accommodation] : pupils were equal in size, round, reactive to light, with normal accommodation [Extraocular Movements] : extraocular movements were intact [Outer Ear] : the ears and nose were normal in appearance [Oropharynx] : the oropharynx was normal [Neck Appearance] : the appearance of the neck was normal [Neck Cervical Mass (___cm)] : no neck mass was observed [Jugular Venous Distention Increased] : there was no jugular-venous distention [Thyroid Diffuse Enlargement] : the thyroid was not enlarged [Thyroid Nodule] : there were no palpable thyroid nodules [Auscultation Breath Sounds / Voice Sounds] : lungs were clear to auscultation bilaterally [Heart Rate And Rhythm] : heart rate was normal and rhythm regular [Heart Sounds] : normal S1 and S2 [Heart Sounds Gallop] : no gallops [Murmurs] : no murmurs [Heart Sounds Pericardial Friction Rub] : no pericardial rub [Full Pulse] : the pedal pulses are present [Edema] : there was no peripheral edema [Bowel Sounds] : normal bowel sounds [Abdomen Soft] : soft [Abdomen Tenderness] : non-tender [Abdomen Mass (___ Cm)] : no abdominal mass palpated [No CVA Tenderness] : no ~M costovertebral angle tenderness [No Spinal Tenderness] : no spinal tenderness [Nail Clubbing] : no clubbing  or cyanosis of the fingernails [Musculoskeletal - Swelling] : no joint swelling seen [Motor Tone] : muscle strength and tone were normal [FreeTextEntry1] :  Unable to do heel walking [Skin Color & Pigmentation] : normal skin color and pigmentation [Skin Turgor] : normal skin turgor [] : no rash

## 2022-03-14 NOTE — HISTORY OF PRESENT ILLNESS
[FreeTextEntry1] :  she is 76-year-old patient with a history of Parkinson disease left more than right side asymmetric onset with superimposed chronic low back pain with recent acute exacerbation with incidental right parietal meningioma stable until recently complaining about it it exacerbation of low back pain radiating into left lower extremity with muscle spasms involving left foot started 2 months ago with intermittent pain involving left foot with difficulty with ambulation while she is walking.\par Followup MRI scan done revealed asymmetric bulging disc at L4-5 and L5-S1 to the left side. Recommended physical therapy which patient declined with worsening symptoms of pain and muscle spasm involving left foot. Coming today for followup evaluation.\par Currently taking Sinemet 25/100 one tablet 4 times a day at times takes 3 times a day. Exercises on her own walking and stretching.\par She feels left foot is weaker and difficulty with ambulation with pain.\par No other difficulties no headaches or dizziness or focal weakness other than left foot symptoms.

## 2022-03-14 NOTE — REVIEW OF SYSTEMS
[Poor Coordination] : poor coordination [Abnormal Sensation] : an abnormal sensation [Difficulty Walking] : difficulty walking [Limping] : limping [Negative] : Heme/Lymph [de-identified] : Rest tremor\par rigidity\par Ch LBP radiating in to left foot with muscle spams

## 2022-03-14 NOTE — DATA REVIEWED
[de-identified] : 8 mm enhancing extra axial mass in Rt frontal convexity consistent with partiially calcified Meningioma.\par Minimal microvascular changes\par Repeat MRI No change [de-identified] : Mild bilat slow [de-identified] : DAVID scan shows decreased activity in Bilat Caudate nucleus RT > left c/w Parkinson disease

## 2022-03-31 ENCOUNTER — APPOINTMENT (OUTPATIENT)
Dept: NEUROLOGY | Facility: CLINIC | Age: 77
End: 2022-03-31
Payer: MEDICARE

## 2022-03-31 DIAGNOSIS — M54.42 LUMBAGO WITH SCIATICA, LEFT SIDE: ICD-10-CM

## 2022-03-31 PROCEDURE — 99212 OFFICE O/P EST SF 10 MIN: CPT

## 2022-03-31 PROCEDURE — 95909 NRV CNDJ TST 5-6 STUDIES: CPT

## 2022-03-31 PROCEDURE — 95886 MUSC TEST DONE W/N TEST COMP: CPT

## 2022-03-31 NOTE — DISCUSSION/SUMMARY
[FreeTextEntry1] : She is 76-year-old patient with the asymmetric onset of Parkinson disease left more than right side symptoms.\par Underlying asymptomatic right parietal meningioma.\par Chronic low back pain with radiculopathy.\par Recommend patient to see neurosurgery for further evaluation.\par Continue physical therapy with exercises as tolerated.\par For Her back pain followup with pain management as she has not seen and evaluated this year.\par Continue Sinemet 25/100 3-4 times a day.\par Followup evaluation in 3 months or as needed.\par

## 2022-03-31 NOTE — HISTORY OF PRESENT ILLNESS
[FreeTextEntry1] : She is 76 and-year-old patient undermined care for evaluation for Parkinson disease, meningioma, chronic low back pain with radiculopathy involving left lower extremity with persistent symptoms involving pain radiating into left lower extremity for chronic pain low back for 4 years and radiculopathy involving left lower extremity for one year.\par Seen and evaluated MRI scan revealed asymmetric bulging at L4-5 and L5-S1 with spondylosis with DJD.\par Physical therapy and epidural steroid injections did not improve her symptoms persist and muscle spasms currently taking Sinemet 25/104 times a day and exercises but feels left foot is weaker.\par Coming today for EMG/NCV studies.

## 2022-03-31 NOTE — REVIEW OF SYSTEMS
[Poor Coordination] : poor coordination [Difficulty Walking] : difficulty walking [Negative] : Heme/Lymph [de-identified] : Rest tremor\par rigidity\par Ch LBP

## 2022-03-31 NOTE — REASON FOR VISIT
[Procedure: _________] : a [unfilled] procedure visit [FreeTextEntry1] : Pt coming for EMG/ NCV study of Left LE

## 2022-03-31 NOTE — PROCEDURE
[FreeTextEntry1] : EMG NCV studies of the left lower extremity with paraspinal muscles performed today.\par Tolerated procedure well.\par The electrodiagnostic study of the left lower extremity revealed findings indicative of chronic L4-5 and L5-S1 radiculopathy.\par No evidence of neuropathy noted.Clinical correlation recommended.\par

## 2022-03-31 NOTE — DATA REVIEWED
[de-identified] : 8 mm enhancing extra axial mass in Rt frontal convexity consistent with partiially calcified Meningioma.\par Minimal microvascular changes\par Repeat MRI No change [de-identified] : Mild bilat slow [de-identified] : DAVID scan shows decreased activity in Bilat Caudate nucleus RT > left c/w Parkinson disease

## 2022-04-25 ENCOUNTER — APPOINTMENT (OUTPATIENT)
Dept: NEUROSURGERY | Facility: CLINIC | Age: 77
End: 2022-04-25
Payer: MEDICARE

## 2022-04-25 VITALS
DIASTOLIC BLOOD PRESSURE: 83 MMHG | WEIGHT: 140 LBS | OXYGEN SATURATION: 97 % | TEMPERATURE: 97.3 F | BODY MASS INDEX: 24.8 KG/M2 | HEIGHT: 63 IN | HEART RATE: 77 BPM | SYSTOLIC BLOOD PRESSURE: 137 MMHG

## 2022-04-25 DIAGNOSIS — G20 PARKINSON'S DISEASE: ICD-10-CM

## 2022-04-25 DIAGNOSIS — M54.50 LOW BACK PAIN, UNSPECIFIED: ICD-10-CM

## 2022-04-25 DIAGNOSIS — M43.17 SPONDYLOLISTHESIS, LUMBOSACRAL REGION: ICD-10-CM

## 2022-04-25 DIAGNOSIS — G89.29 LOW BACK PAIN, UNSPECIFIED: ICD-10-CM

## 2022-04-25 PROCEDURE — 99203 OFFICE O/P NEW LOW 30 MIN: CPT

## 2022-05-09 PROBLEM — M43.17 SPONDYLOLISTHESIS OF LUMBOSACRAL REGION: Status: ACTIVE | Noted: 2022-05-09

## 2022-05-09 PROBLEM — M54.50 ACUTE EXACERBATION OF CHRONIC LOW BACK PAIN: Status: ACTIVE | Noted: 2021-12-20

## 2022-05-09 PROBLEM — G20 PARKINSON'S DISEASE: Status: ACTIVE | Noted: 2020-10-22

## 2022-05-09 NOTE — CONSULT LETTER
[Dear  ___] : Dear  [unfilled], [Courtesy Letter:] : I had the pleasure of seeing your patient, [unfilled], in my office today. [Sincerely,] : Sincerely, [FreeTextEntry2] : Holli Santiago MD [FreeTextEntry1] : This is a very pleasant 76 year-old woman being evaluated in our office for progressive problems with lower back pain with episodic worsening of left foot pain with toe cramps and spasms.   Her medical history is significant for Parkinsons Disease.  The patient indicates her symptoms have been progressing for the last year.   The back pain is described as an ache.  As a result of her symptoms she has difficulty standing.   The patient does describe a foot spasm when the pain is present and the ankle twists sideways.  There is no sensory loss of her lower extremities.   She denies any specific onset trauma or other straining event.   The patient indicates she has undergone multiple epidural steroid injections with significant but not sustained relief.  \par \par I have reviewed with the patient specifically her most recent MRI scan of the lumbar spine performed at [facility] on 1/17/2022.  It demonstrates minor spondylotic degeneration at multiple levels of the lumbar spine.  At L4-L5  there is minor anterolisthesis and narrowing at the L 5 nerve root.   There is no evidence of significant disc herniation or effect of the lateral recesses or central canal stenosis. The patient indicated that she had undergone EMG nerve conduction studies in the past showing a chronic radiculopathy at L4-L5 and L5-S1.  \par \par On examination the patient is in no acute distress. There is minor pain with ROM. The patient has intact sensation to light touch and cool temperature. Strength is normal and symmetric. patella reflexes are normal. ankle reflexes are present but decreased. there is no clonus. \par \par There are only moderate degenerative changes in the lumbar spine and minor anterolisthesis at L4/5. The patient has unfortunately only had temporary benefit with conservative measures. However I would not propose a surgical intervention at this time.  I have recommended consideration of ongoing physical therapy including flexibility and traction modalities would be of benefit as well as developing a lower back maintenance program.  She indicated good understanding and is in agreement with conservative ongoing approach.  Finally, other options might include an evaluation for pain management and consider an epidural of the spine at the levels above.  I will see the patient on a PRN basis if the non surgical measures are not effective. \par \par Thank you for very kindly including me in the evaluation and treatment of your patient.  Please do not hesitate to contact me should you have any concerns or questions regarding this evaluation or proposed follow-up treatment and evaluation plan. [FreeTextEntry3] : \par Doug Guerrier MD, PhD, FRCPSC                           \par Attending Neurosurgeon \par  of Neurosurgery \par [facility] \par [address], [address]nd floor \[place] [address] \par Office: [phone] \par Fax: [phone]\par

## 2022-05-09 NOTE — REASON FOR VISIT
[New Patient Visit] : a new patient visit [FreeTextEntry1] : lower back pain with left sciatica and some neurogenic claudication

## 2022-05-17 ENCOUNTER — APPOINTMENT (OUTPATIENT)
Dept: PHYSICAL MEDICINE AND REHAB | Facility: CLINIC | Age: 77
End: 2022-05-17
Payer: MEDICARE

## 2022-05-17 VITALS
DIASTOLIC BLOOD PRESSURE: 88 MMHG | WEIGHT: 137 LBS | OXYGEN SATURATION: 97 % | SYSTOLIC BLOOD PRESSURE: 135 MMHG | BODY MASS INDEX: 24.27 KG/M2 | HEART RATE: 71 BPM | HEIGHT: 63 IN

## 2022-05-17 DIAGNOSIS — G89.29 LOW BACK PAIN, UNSPECIFIED: ICD-10-CM

## 2022-05-17 DIAGNOSIS — M54.50 LOW BACK PAIN, UNSPECIFIED: ICD-10-CM

## 2022-05-17 PROCEDURE — 99204 OFFICE O/P NEW MOD 45 MIN: CPT | Mod: GC

## 2022-05-17 NOTE — ASSESSMENT
[FreeTextEntry1] : Ms. LANNY STUART is a 76 year old female who presents with left sided low back pain. Her pain is likely secondary to facet arthropathy vs less likely lumbar radiculopathy, although she does have EMG/NCS evidence of a radiculopathy. Denies any red flag signs. Will recommend:\par - MRI L Spine reviewed by me. Neurology notes reviewed. Neurosurgery notes reviewed.\par - Start PT 2-3x/week for stretching, strengthening (especially of core muscles), ROM exercises, HEP and modalities PRN including myofascial release, moist heat, and TENS therapy \par - Advised patient to try tylenol instead of Advil for pain relief, for which she agreed. Told her she can take up to 3g/day. Also suggested that if Tylenol doesn't work, she can try Aleve although she was cautioned about potential GI side effects. \par \par RTC in 4-5 weeks. Patient aware of red flag signs including any changes to their bowel/bladder control, groin numbness or new weakness. Patient knows to seek immediate attention by calling 911 or going to nearest ER if these symptoms appear.  Performing Laboratory: -[phone] Bill For Surgical Tray: no Lab Facility: [id number] Expected Date Of Service: 12/08/2020 Billing Type: [employer]

## 2022-05-17 NOTE — DATA REVIEWED
[FreeTextEntry1] : EMG/NCS: Chronic L4-5 and L5-S1 radiculopathies. \par \par MRI L Spine [facility] radiology  22 reviewed by me: multilevel spondylosis, no severe canal stenosis seen\par \par PATIENT NAME: Eliza Khan\par PATIENT PHONE NUMBER:\par PATIENT ID: [id number]\par : 1945\par DATE OF EXAM: 2022\par R. Phys. Name: Chana Turpin\par R. Phys. Address: [address] [place] [address]\par R. Phys. Phone: [phone]\par MRI-LUMBAR SPINE NON CONTRAST\par \par HISTORY: M54.5 Lower Back Pain M54.42 Lower back pain with left\par sciatica M79.605 Left Leg Pain R26.2 Difficulty Walking\par \par Technique: Multiplanar, multisequence MRI of the lumbar spine was performed\par without the administration of intravenous contrast .\par \par Findings:\par \par Priors: Left sciatica\par \par Alignment: Trace anterolisthesis at L4-L5\par \par Conus: The conus is normal in size and signal.\par \par Bone marrow: No evidence of metastatic disease or acute vertebral body\par compression fracture.\par \par Discs:Multilevel disc desiccation and disc space narrowing.\par \par At L5-S1: There is asymmetric disc bulge with moderate left neuroforaminal\par stenosis unchanged from previous examination.\par \par At L4-5: There is trace anterolisthesis and facet arthropathy. There is no\par significant spinal canal or neural foraminal stenosis.\par \par At L3-4: Minimal disc bulge without significant spinal canal or neural foraminal\par stenosis.\par \par At L2-3: Minimal disc bulge without significant spinal canal or neural foraminal\par stenosis.\par \par At L1-2: Minimal disc bulge without significant spinal canal or neural foraminal\par stenosis.\par \par At T12-L1 there is minimal retrolisthesis. 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 [phone] 06:45 PM

## 2022-05-17 NOTE — HISTORY OF PRESENT ILLNESS
[FreeTextEntry1] : Ms. LANNY STUART is a 76 year old female with a PMHx of Parkinson's Disease and meningioma who presents with low back pain. Patient referred by Dr. Guerrier. \par \par Location: left low back\par Onset: a few years ago it started, and is worsening over the past year\par Provocation/Palliative: moving, standing worsens, lying down improves the pain\par Quality: dull pain\par Radiation: burning pain up the spine and into the foot\par Severity: 3/10 currently, 8/10 at its worst\par Timing: worsening with time\par \par Denies any associated numbness. Denies any associated leg weakness. Denies any loss of bowel/bladder control or any groin numbness.\par Previous medications trialed: Ibuprofen 2-3 x200mg once to twice a day with some relief\par Previous procedures relevant to complaint:has tried ESIs in past with some relief in 2019\par Conservative therapy tried?: starting PT for back today\par

## 2022-05-17 NOTE — PHYSICAL EXAM
[FreeTextEntry1] : PE:\par Constitutional: In NAD, calm and cooperative\par MSK (Back)\par 	Inspection: no gross swelling identified\par 	Palpation: Tenderness of the left lower lumbar paraspinals\par 	ROM: Pain at end lumbar extension\par 	Strength: 5/5 strength in bilateral lower extremities\par 	Reflexes: 2+ Patella reflex bilaterally, 2+ Achilles reflex bilaterally, negative clonus bilaterally\par 	Sensation: Intact to light touch in bilateral lower extremities\par Special tests:\par SLR: neg\par TOREY: neg\par FADIR: neg\par Facet loading: pos on left

## 2022-06-14 ENCOUNTER — APPOINTMENT (OUTPATIENT)
Dept: PHYSICAL MEDICINE AND REHAB | Facility: CLINIC | Age: 77
End: 2022-06-14
Payer: MEDICARE

## 2022-06-14 VITALS
WEIGHT: 137 LBS | HEIGHT: 63 IN | SYSTOLIC BLOOD PRESSURE: 134 MMHG | HEART RATE: 70 BPM | BODY MASS INDEX: 24.27 KG/M2 | OXYGEN SATURATION: 98 % | DIASTOLIC BLOOD PRESSURE: 86 MMHG

## 2022-06-14 PROCEDURE — 99213 OFFICE O/P EST LOW 20 MIN: CPT

## 2022-06-14 NOTE — PHYSICAL EXAM
[FreeTextEntry1] : PE:\par Constitutional: In NAD, calm and cooperative\par MSK (Back)\par 	Inspection: no gross swelling identified\par 	Palpation: Minimal tenderness of the left lower lumbar paraspinals\par 	ROM: Pain at end lumbar extension and at end lumbar flexion (able to flex 80 degrees)\par 	Strength: 5/5 strength in bilateral lower extremities\par 	Reflexes: 2+ Patella reflex bilaterally, 2+ Achilles reflex bilaterally, negative clonus bilaterally\par 	Sensation: Intact to light touch in bilateral lower extremities\par Special tests:\par SLR: neg\par TOREY: neg\par FADIR: neg\par Facet loading: pos on left

## 2022-06-14 NOTE — ASSESSMENT
[FreeTextEntry1] : Ms. LANNY STUART is a 76 year old female who presents with left sided low back pain. Her pain is likely secondary to facet arthropathy vs less likely lumbar radiculopathy, although she does have EMG/NCS evidence of a radiculopathy. She is improving with conservative care. Denies any red flag signs. Will recommend:\par - Continue PT 2-3x/week for stretching, strengthening (especially of core muscles), ROM exercises, HEP and modalities PRN including myofascial release, moist heat, and TENS therapy \par - Patient to continue Tylenol PRN, up to 3g/day if needed.\par \par RTC in 4-5 weeks. Patient aware of red flag signs including any changes to their bowel/bladder control, groin numbness or new weakness. Patient knows to seek immediate attention by calling 911 or going to nearest ER if these symptoms appear.

## 2022-06-14 NOTE — HISTORY OF PRESENT ILLNESS
[FreeTextEntry1] : Ms. LANNY STUART is a 77 year old  female who presents for follow up. At last visit, she was started on PT and told to try Tylenol instead of Advil. Overall she is doing better with PT and Tylenol. Taking Tylenol, once a day. Denies any new symptoms. \par \par Location: left low back, but also over central back as well. \par Onset: a few years ago it started, and is worsening over the past year\par Provocation/Palliative: moving, standing worsens, lying down improves the pain\par Quality: dull pain\par Radiation: Very rarely down LLE to left foot\par Severity: 3/10 currently, 8/10 at its worst\par Timing: better since starting PT\par \par No bowel/bladder changes. No groin numbness.

## 2022-07-19 ENCOUNTER — APPOINTMENT (OUTPATIENT)
Dept: PHYSICAL MEDICINE AND REHAB | Facility: CLINIC | Age: 77
End: 2022-07-19

## 2022-07-19 VITALS
DIASTOLIC BLOOD PRESSURE: 84 MMHG | HEIGHT: 63 IN | HEART RATE: 72 BPM | SYSTOLIC BLOOD PRESSURE: 132 MMHG | BODY MASS INDEX: 24.27 KG/M2 | OXYGEN SATURATION: 98 % | WEIGHT: 137 LBS

## 2022-07-19 PROCEDURE — 99214 OFFICE O/P EST MOD 30 MIN: CPT

## 2022-07-19 NOTE — HISTORY OF PRESENT ILLNESS
[FreeTextEntry1] : Ms. LANNY STUART is a 77 year old  female who presents for follow up. At last visit, she was continued on PT and Tylenol PRN.She has been feeling a little better, but still with some pain. Denies any new symptoms. Still occasionally taking Tylenol PRN. She is doing a HEP as well. \par \par Location: left low back, but also over central back as well. \par Onset: a few years ago it started, and is worsening over the past year\par Provocation/Palliative: moving, standing worsens, lying down improves the pain\par Quality: dull pain\par Radiation: Very rarely down LLE to left foot\par Severity: 3/10 currently, 8/10 at its worst\par Timing: better since starting PT\par \par No bowel/bladder changes. No groin numbness.

## 2022-07-19 NOTE — ASSESSMENT
[FreeTextEntry1] : Ms. LANNY STUART is a 77 year old female who presents with left sided low back pain. Her pain is likely secondary to facet arthropathy as well as likely lumbar radiculopathy as she does have EMG/NCS evidence of a radiculopathy and does have some pain down her LLE. . She is improving with conservative care. Denies any red flag signs. Will recommend:\par - Discussed with patient the risks (including but not limited to bleeding, infection, nerve damage, etc), benefits and alternatives to an epidural steroid injection for which patient understands. Will plan for a left L5-S1 TFESI. Patient will need a COVID PCR test 3 days prior. \par - Continue PT 1x/week for stretching, strengthening (especially of core muscles), ROM exercises, HEP and modalities PRN including myofascial release, moist heat, and TENS therapy \par - Patient to continue Tylenol PRN, up to 3g/day if needed.\par \par RTC in 4-5 weeks. Patient aware of red flag signs including any changes to their bowel/bladder control, groin numbness or new weakness. Patient knows to seek immediate attention by calling 911 or going to nearest ER if these symptoms appear.

## 2022-07-19 NOTE — PHYSICAL EXAM
[FreeTextEntry1] : PE:\par Constitutional: In NAD, calm and cooperative\par MSK (Back)\par 	Inspection: no gross swelling identified\par 	Palpation: Mild tenderness of the left lower lumbar paraspinals\par 	ROM: Pain at end lumbar extension and at end lumbar flexion (able to flex 80 degrees)\par 	Strength: 5/5 strength in bilateral lower extremities\par 	Reflexes: 2+ Patella reflex bilaterally, 2+ Achilles reflex bilaterally, negative clonus bilaterally\par 	Sensation: Intact to light touch in bilateral lower extremities\par Special tests:\par SLR: equivocal on left\par TOREY: neg\par FADIR: neg\par Facet loading: pos on left

## 2022-09-12 ENCOUNTER — APPOINTMENT (OUTPATIENT)
Dept: NEUROLOGY | Facility: CLINIC | Age: 77
End: 2022-09-12

## 2022-09-12 VITALS
HEIGHT: 63 IN | BODY MASS INDEX: 24.8 KG/M2 | TEMPERATURE: 97.8 F | DIASTOLIC BLOOD PRESSURE: 77 MMHG | HEART RATE: 71 BPM | WEIGHT: 140 LBS | SYSTOLIC BLOOD PRESSURE: 122 MMHG

## 2022-09-12 PROCEDURE — 99214 OFFICE O/P EST MOD 30 MIN: CPT

## 2022-09-12 NOTE — PHYSICAL EXAM
[General Appearance - Alert] : alert [General Appearance - In No Acute Distress] : in no acute distress [Oriented To Time, Place, And Person] : oriented to person, place, and time [Impaired Insight] : insight and judgment were intact [Affect] : the affect was normal [Cranial Nerves Optic (II)] : visual acuity intact bilaterally,  visual fields full to confrontation, pupils equal round and reactive to light [Cranial Nerves Oculomotor (III)] : extraocular motion intact [Cranial Nerves Trigeminal (V)] : facial sensation intact symmetrically [Cranial Nerves Facial (VII)] : face symmetrical [Cranial Nerves Vestibulocochlear (VIII)] : hearing was intact bilaterally [Cranial Nerves Glossopharyngeal (IX)] : tongue and palate midline [Cranial Nerves Accessory (XI - Cranial And Spinal)] : head turning and shoulder shrug symmetric [Cranial Nerves Hypoglossal (XII)] : there was no tongue deviation with protrusion [Motor Strength] : muscle strength was normal in all four extremities [Motor Handedness Right-Handed] : the patient is right hand dominant [Paresis Pronator Drift Right-Sided] : no pronator drift on the right [Paresis Pronator Drift Left-Sided] : no pronator drift on the left [Motor Strength Upper Extremities Bilaterally] : strength was normal in both upper extremities [Motor Strength Lower Extremities Bilaterally] : strength was normal in both lower extremities [Abnormal Walk] : normal gait [Tremor] : a tremor present [Dysdiadochokinesia Bilaterally] : not present [Coordination - Dysmetria Impaired Finger-to-Nose Bilateral] : not present [FreeTextEntry6] : Bradykinesia, slow alternating hand movements on the left, more than the right

## 2022-09-12 NOTE — REVIEW OF SYSTEMS
[Difficulty Walking] : difficulty walking [As Noted in HPI] : as noted in HPI [Negative] : Heme/Lymph [FreeTextEntry9] : Chronic back pain

## 2022-09-12 NOTE — HISTORY OF PRESENT ILLNESS
[FreeTextEntry1] : 77-year-old woman with a history of Parkinson disease, here for follow-up visit, last seen by physician this office who recently retired. History of chronic back pain, lower extremity radiculopathy.  Recently evaluated by neurosurgery, decision made to continue supportive care.\par Continues on Sinemet 25/100, 4 times a day.  Main complaint is tremors of\par More than right, worse with stress.  Also notes when she walks, the left foot will cramp, almost twisting them.\par

## 2022-09-12 NOTE — DISCUSSION/SUMMARY
[FreeTextEntry1] : 77-year-old woman, history of Parkinson's disease, more symptomatic on the left than the right, status affecting gait, with possible left foot dystonia.\par Plan: Rytary 195 TID\par If unable to afford or not covered can increase 25/100, 1,1/2 tabs po TID-QID\par RTO 3-4 months

## 2022-09-24 ENCOUNTER — NON-APPOINTMENT (OUTPATIENT)
Age: 77
End: 2022-09-24

## 2022-09-26 LAB — SARS-COV-2 N GENE NPH QL NAA+PROBE: NOT DETECTED

## 2022-09-27 ENCOUNTER — APPOINTMENT (OUTPATIENT)
Dept: PHYSICAL MEDICINE AND REHAB | Facility: AMBULATORY MEDICAL SERVICES | Age: 77
End: 2022-09-27

## 2022-09-27 PROCEDURE — 64483 NJX AA&/STRD TFRM EPI L/S 1: CPT | Mod: LT

## 2022-10-11 ENCOUNTER — APPOINTMENT (OUTPATIENT)
Dept: PHYSICAL MEDICINE AND REHAB | Facility: CLINIC | Age: 77
End: 2022-10-11

## 2022-10-11 VITALS
HEART RATE: 78 BPM | HEIGHT: 63 IN | BODY MASS INDEX: 24.8 KG/M2 | OXYGEN SATURATION: 96 % | WEIGHT: 140 LBS | DIASTOLIC BLOOD PRESSURE: 88 MMHG | SYSTOLIC BLOOD PRESSURE: 146 MMHG

## 2022-10-11 DIAGNOSIS — M54.16 RADICULOPATHY, LUMBAR REGION: ICD-10-CM

## 2022-10-11 PROCEDURE — 99214 OFFICE O/P EST MOD 30 MIN: CPT

## 2022-10-11 NOTE — PHYSICAL EXAM
[FreeTextEntry1] : PE:\par Constitutional: In NAD, calm and cooperative\par MSK (Back)\par 	Inspection: no gross swelling identified\par 	Palpation: Mild tenderness of the left lower lumbar paraspinals\par 	ROM: Pain at end lumbar extension and at end lumbar flexion (able to flex 80 degrees)\par 	Strength: 5/5 strength in bilateral lower extremities\par 	Reflexes: 2+ Patella reflex bilaterally, 2+ Achilles reflex bilaterally, negative clonus bilaterally\par 	Sensation: Intact to light touch in bilateral lower extremities\par Special tests:\par SLR: negative on left\par TOREY: neg\par FADIR: neg\par Facet loading: pos on left

## 2022-10-11 NOTE — ASSESSMENT
[FreeTextEntry1] : Ms. LANNY STUART is a 77 year old female who presents with left sided low back pain. Her pain is likely secondary to facet arthropathy as well as likely lumbar radiculopathy as she does have EMG/NCS evidence of a radiculopathy and didhave some pain down her LLE. She got some relief with recent TOÑO and her pain is now located mainly in her left low back. Denies any red flag signs. Will recommend:\par - Discussed with patient the risks (including but not limited to bleeding, infection, nerve damage, etc), benefits and alternatives to a medial branch block for which patient understands. Will plan for a left L4-5 and L5-S1 MBB. Patient will need a COVID PCR test 3 days prior. \par - Continue HEP\par - Patient to continue Tylenol PRN, up to 3g/day if needed.\par \par Return for procedure. Patient aware of red flag signs including any changes to their bowel/bladder control, groin numbness or new weakness. Patient knows to seek immediate attention by calling 911 or going to nearest ER if these symptoms appear.

## 2022-10-11 NOTE — HISTORY OF PRESENT ILLNESS
[FreeTextEntry1] : Ms. LANNY STUART is a 77 year old  female who presents for follow up. At last visit, she was ordered a L L5-S1 TFESI for which she had done on 9/27/22. She only got 20% relief from the injection but did have resolution of her radicular pain. Denies any side effects or adverse effects. \par \par Location: left low back, but also over central back as well. \par Onset: a few years ago it started, and is worsening over the past year\par Provocation/Palliative: moving, standing worsens, lying down improves the pain\par Quality: dull pain\par Radiation:  None at this time\par Severity: 7/10 today\par Timing: Not improving greatly with time\par \par No bowel/bladder changes. No groin numbness.

## 2022-12-22 ENCOUNTER — APPOINTMENT (OUTPATIENT)
Dept: NEUROLOGY | Facility: CLINIC | Age: 77
End: 2022-12-22

## 2022-12-22 VITALS
DIASTOLIC BLOOD PRESSURE: 77 MMHG | TEMPERATURE: 97.8 F | HEIGHT: 63 IN | HEART RATE: 71 BPM | WEIGHT: 140 LBS | SYSTOLIC BLOOD PRESSURE: 115 MMHG | BODY MASS INDEX: 24.8 KG/M2

## 2022-12-22 PROCEDURE — 99214 OFFICE O/P EST MOD 30 MIN: CPT

## 2022-12-22 NOTE — HISTORY OF PRESENT ILLNESS
[FreeTextEntry1] : 77-year-old woman, right-handed history of Parkinson disease, chronic back pain here for follow-up visit.  Last seen in September of this year.\par Complains of intermittent tremors, stiffness when she walks, occasional twisting, cramping sensations in the left foot when she walks.  Usually happens at least an hour hour and a half after taking her Sinemet.  Improves after she relaxes, sits.\par Patient reports she tries to walk every day, lives 4 miles.  Taking the habit of going to the walk with minimal, for a walk inside the mall every day usually 8 or 8 AM.\par No recent falls or injuries.\par Continues on Sinemet 25 103-4 times a day.\par No trouble with the medication, no daytime fatigue or drowsiness.  No compulsive thinking.  No mood changes.\par Continues suffering from chronic back pain, can radiate down the legs, both legs, worse with prolonged activity, prolonged standing.\par Had earlier this year, epidural steroid injection, which were partly helpful for very brief period time.  Being considered for rhizotomies.\par Tried physical therapy also not very helpful.

## 2022-12-22 NOTE — DISCUSSION/SUMMARY
[FreeTextEntry1] : 77-year-old woman, history of Parkinson disease, slightly more symptomatic.  And noted more difficulty walking.\par Reviewed and discussed treatment options.  In the last visit, retiree sent to her pharmacy, but patient picked it up but has not started yet, because she wanted to finish her stock of the previous medication.\par Discussed the use of medication.\par Advised to take Sinemet 25/100, 1/2 tablet in the morning when she first wakes up, then 1 tablet 2-3 times a day afterwards.\par After she runs out of this medication.\par Will try retiree which she already has, 3 times a day.\par Advised patient to call me in a month to give me an idea how she doing with it.  May consider adjusting dosage.\par Patient to follow-up with pain management.\par Return to the office, 3 to 4 months.

## 2022-12-22 NOTE — PHYSICAL EXAM
[General Appearance - Alert] : alert [General Appearance - In No Acute Distress] : in no acute distress [Oriented To Time, Place, And Person] : oriented to person, place, and time [Impaired Insight] : insight and judgment were intact [Affect] : the affect was normal [Person] : oriented to person [Place] : oriented to place [Time] : oriented to time [Fluency] : fluency intact [Cranial Nerves Optic (II)] : visual acuity intact bilaterally,  visual fields full to confrontation, pupils equal round and reactive to light [Cranial Nerves Oculomotor (III)] : extraocular motion intact [Cranial Nerves Trigeminal (V)] : facial sensation intact symmetrically [Cranial Nerves Facial (VII)] : face symmetrical [Cranial Nerves Vestibulocochlear (VIII)] : hearing was intact bilaterally [Cranial Nerves Accessory (XI - Cranial And Spinal)] : head turning and shoulder shrug symmetric [Cranial Nerves Hypoglossal (XII)] : there was no tongue deviation with protrusion [Motor Strength] : muscle strength was normal in all four extremities [Motor Handedness Right-Handed] : the patient is right hand dominant [Paresis Pronator Drift Right-Sided] : no pronator drift on the right [Paresis Pronator Drift Left-Sided] : no pronator drift on the left [Motor Strength Upper Extremities Bilaterally] : strength was normal in both upper extremities [Motor Strength Lower Extremities Bilaterally] : strength was normal in both lower extremities [Tremor] : a tremor present [Dysdiadochokinesia Bilaterally] : not present [Coordination - Dysmetria Impaired Finger-to-Nose Bilateral] : not present [FreeTextEntry6] : Bradykinesia, slow alternating hand movements on the left, more than the right [FreeTextEntry8] : Mild festinating gait, with decreased arm swing, more on the left than the right.

## 2022-12-22 NOTE — REVIEW OF SYSTEMS
[As Noted in HPI] : as noted in HPI [Difficulty Walking] : difficulty walking [Negative] : Heme/Lymph [FreeTextEntry9] : Chronic back pain

## 2023-01-17 ENCOUNTER — APPOINTMENT (OUTPATIENT)
Dept: PHYSICAL MEDICINE AND REHAB | Facility: CLINIC | Age: 78
End: 2023-01-17
Payer: MEDICARE

## 2023-01-17 VITALS
TEMPERATURE: 98 F | SYSTOLIC BLOOD PRESSURE: 131 MMHG | HEIGHT: 63 IN | OXYGEN SATURATION: 98 % | DIASTOLIC BLOOD PRESSURE: 86 MMHG | BODY MASS INDEX: 24.8 KG/M2 | HEART RATE: 75 BPM | WEIGHT: 140 LBS

## 2023-01-17 PROCEDURE — 99213 OFFICE O/P EST LOW 20 MIN: CPT

## 2023-01-17 NOTE — ASSESSMENT
[FreeTextEntry1] : Ms. LANNY STUART is a 77 year old female who presents with left sided low back pain. Her pain is likely secondary to facet arthropathy as well as likely lumbar radiculopathy as she does have EMG/NCS evidence of a radiculopathy and did have some pain down her LLE. She got some relief with recent TOÑO and her pain is now located mainly in her left low back. Denies any red flag signs. Will recommend:\par - Discussed with patient the risks (including but not limited to bleeding, infection, nerve damage, etc), benefits and alternatives to a medial branch block for which patient understands. Will plan for a left L4-5 and L5-S1 MBB. Patient will need a COVID PCR test 3 days prior. \par - Continue HEP\par - Patient to continue Tylenol PRN, up to 3g/day if needed.\par \par Return for procedure. Patient aware of red flag signs including any changes to their bowel/bladder control, groin numbness or new weakness. Patient knows to seek immediate attention by calling 911 or going to nearest ER if these symptoms appear.

## 2023-01-17 NOTE — HISTORY OF PRESENT ILLNESS
[FreeTextEntry1] : Ms. LANNY STUART is a 77 year old  female who presents for follow up. At last visit she was she was planned for a MBB and continued on HEP/Tylenol. She never got the MBB due to scheduling issues. Denies any new issues just persistent pain. \par \par Location: left low back, but also over central back as well. \par Onset: a few years ago it started, and is worsening over the past year\par Provocation/Palliative: moving, standing worsens, lying down improves the pain\par Quality: dull pain\par Radiation: None at this time\par Severity: 4-5/10 today\par Timing: Staying about the same. \par \par No bowel/bladder changes. No groin numbness.

## 2023-01-24 ENCOUNTER — APPOINTMENT (OUTPATIENT)
Dept: PHYSICAL MEDICINE AND REHAB | Facility: AMBULATORY MEDICAL SERVICES | Age: 78
End: 2023-01-24
Payer: MEDICARE

## 2023-01-24 PROCEDURE — 64493 INJ PARAVERT F JNT L/S 1 LEV: CPT | Mod: LT

## 2023-01-24 PROCEDURE — 64494 INJ PARAVERT F JNT L/S 2 LEV: CPT | Mod: LT

## 2023-01-26 NOTE — PROCEDURE
[de-identified] : Lumbar Medial Branch Block Under Fluoroscopic Guidance\par \par \par Attending: Jeremy Swartz DO\par \par PREOPERATIVE DIAGNOSIS: Lumbar Facet Joint Pain\par POSTOPERATIVE DIAGNOSIS: same\par \par SEDATION: As per Anesthesia\par  \par PROCEDURE PERFORMED:  Lumbar Medial Branch Block at  left L4-5 and L5-S1 levels\par  \par ESTIMATED BLOOD LOSS:  None\par FLUOROSCOPY WAS USED.\par \par  \par PROCEDURE AND FINDINGS:  \par Patient was greeted in the pre-procedure holding area. The risk, benefits and alternatives to the procedure were again reviewed with the patient and written informed consent was placed in the chart. They were taken to the procedure room and positioned prone on the fluoroscopy table.  Prior to the procedure a time out was completed, verifying correct patient, procedure, and site.  \par  \par An AP fluoroscopic  film was taken to identify the vertebral bodies, pedicles, transverse processes and superior articulating processes of interest. The skin was prepped with chlorhexidine and draped in the usual sterile fashion. The skin and subcutaneous tissue overlying the aforementioned anatomic targets were anesthetized using a 27-gauge 1-1/2 inch needle with 1% preservative-free lidocaine for a total volume of 3 mls.  \par  \par Then a 25-gauge 3.5 inch Quincke spinal needle was advanced under fluoroscopic guidance to the junction of the superior articular process and transverse process of the levels of interest (and at the junction of the sacral ala and superior articular process for the L5 dorsal ramus). After negative aspiration, 0.5 mls of contrast was injected under AP view at each level. There was no evidence of intravascular uptake or intrathecal spread on imaging. \par  \par At this point, after negative aspiration, a solution of 0.5ml of Bupivacaine 0.5% was injected at each level without incident. The needles were flushed with a small amount of contrast and removed. The needle insertion site was dressed appropriately. \par \par Patient was taken to the recovery room where they were monitored for a brief period of time. They tolerated the procedure well and were discharged home in stable condition with post procedural instructions.\par

## 2023-01-31 ENCOUNTER — APPOINTMENT (OUTPATIENT)
Dept: PHYSICAL MEDICINE AND REHAB | Facility: CLINIC | Age: 78
End: 2023-01-31
Payer: MEDICARE

## 2023-01-31 VITALS
HEIGHT: 63 IN | TEMPERATURE: 97.8 F | DIASTOLIC BLOOD PRESSURE: 84 MMHG | BODY MASS INDEX: 24.8 KG/M2 | OXYGEN SATURATION: 97 % | HEART RATE: 73 BPM | SYSTOLIC BLOOD PRESSURE: 131 MMHG | WEIGHT: 140 LBS

## 2023-01-31 PROCEDURE — 99213 OFFICE O/P EST LOW 20 MIN: CPT

## 2023-01-31 NOTE — PHYSICAL EXAM
[FreeTextEntry1] : PE:\par Constitutional: In NAD, calm and cooperative\par MSK (Back)\par 	Inspection: no gross swelling identified, no swelling or erythema over injection sites\par 	Palpation: Mild tenderness of the left lower lumbar paraspinals\par 	ROM: No pain at end lumbar flexion/extension\par 	Strength: 5/5 strength in bilateral lower extremities\par 	Reflexes: 2+ Patella reflex bilaterally, 2+ Achilles reflex bilaterally, negative clonus bilaterally\par 	Sensation: Intact to light touch in bilateral lower extremities\par Special tests:\par SLR: negative on left\par TOREY: neg bilaterally\par FADIR: neg bilaterally\par Facet loading: negative bilaterally

## 2023-01-31 NOTE — ASSESSMENT
[FreeTextEntry1] : Ms. LANNY STUART is a 77 year old female who presents with left sided low back pain. Her pain is likely secondary to facet arthropathy as well as likely lumbar radiculopathy as she does have EMG/NCS evidence of a radiculopathy and did have some pain down her LLE. She got some relief with recent TOÑO and her pain is now located mainly in her left low back for which MBB now has dramatically improved her pain.  Denies any red flag signs. Will recommend:\par - Discussed with patient the risks (including but not limited to bleeding, infection, nerve damage, etc), benefits and alternatives to a repeat medial branch block as well as an RFA if 2nd medial branch block is successful for which patient understands.\par - Continue HEP\par - Patient to continue Tylenol PRN, up to 3g/day if needed.\par \par Return for 2nd MBB if pain returns. Patient aware of red flag signs including any changes to their bowel/bladder control, groin numbness or new weakness. Patient knows to seek immediate attention by calling 911 or going to nearest ER if these symptoms appear.

## 2023-01-31 NOTE — HISTORY OF PRESENT ILLNESS
[FreeTextEntry1] : Ms. LANNY STUART is a 77 year old  female who presents for follow up. At last visit, she was ordered a left L4-5 and L5-S1 MBB for which she had done on 1/24/23. She got over 90% relief from the injection. Denies any side effects. \par \par Location: left low back, but also over central back as well. \par Onset: a few years ago it started, and is worsening over the past year\par Provocation/Palliative: moving, standing worsens, lying down improves the pain\par Quality: dull pain\par Radiation: None at this time\par Severity: 1/10 today\par Timing: Improved after MBB\par \par No bowel/bladder changes. No groin numbness.

## 2023-02-16 NOTE — DISCUSSION/SUMMARY
Addended by: NILAM PICKETT on: 2/16/2023 01:04 PM     Modules accepted: Orders     [FreeTextEntry1] : She is 76-year-old patient with Parkinson disease with asymmetric onset left more than right side with postural instability.\par Acute exacerbation low back pain left more than right side with left foot drop mild weakness noted for 2 months Refused PT or surgical opinion.in the past.\par Incidental right parietal meningioma.\par MRI showed bulging discs without any herniation spondylolisthesis.\par Recommend patient to start physical therapy.\par Recommend to see neurosurgery.\par Followup MRI of the brain with and without contrast pending.\par EMG/NCV study of left lower extremity.\par Continue Sinemet 25/100 one tablet 4 times a day.\par Followup evaluation after the workup is completed.\par Patient education provided and discussed with the patient.

## 2023-03-07 ENCOUNTER — OUTPATIENT (OUTPATIENT)
Dept: OUTPATIENT SERVICES | Facility: HOSPITAL | Age: 78
LOS: 1 days | End: 2023-03-07
Payer: MEDICARE

## 2023-03-07 DIAGNOSIS — R07.9 CHEST PAIN, UNSPECIFIED: ICD-10-CM

## 2023-03-07 DIAGNOSIS — H26.9 UNSPECIFIED CATARACT: Chronic | ICD-10-CM

## 2023-03-07 DIAGNOSIS — Z90.89 ACQUIRED ABSENCE OF OTHER ORGANS: Chronic | ICD-10-CM

## 2023-03-07 DIAGNOSIS — S82.899A OTHER FRACTURE OF UNSPECIFIED LOWER LEG, INITIAL ENCOUNTER FOR CLOSED FRACTURE: Chronic | ICD-10-CM

## 2023-03-07 DIAGNOSIS — Z98.890 OTHER SPECIFIED POSTPROCEDURAL STATES: Chronic | ICD-10-CM

## 2023-03-07 PROCEDURE — 93018 CV STRESS TEST I&R ONLY: CPT

## 2023-03-07 PROCEDURE — 78452 HT MUSCLE IMAGE SPECT MULT: CPT | Mod: 26,MH

## 2023-03-07 PROCEDURE — A9500: CPT

## 2023-03-07 PROCEDURE — 93016 CV STRESS TEST SUPVJ ONLY: CPT

## 2023-03-07 PROCEDURE — 78452 HT MUSCLE IMAGE SPECT MULT: CPT

## 2023-03-07 PROCEDURE — 93017 CV STRESS TEST TRACING ONLY: CPT

## 2023-06-13 ENCOUNTER — RX RENEWAL (OUTPATIENT)
Age: 78
End: 2023-06-13

## 2023-07-05 ENCOUNTER — APPOINTMENT (OUTPATIENT)
Dept: NEUROLOGY | Facility: CLINIC | Age: 78
End: 2023-07-05
Payer: MEDICARE

## 2023-07-05 VITALS
SYSTOLIC BLOOD PRESSURE: 112 MMHG | DIASTOLIC BLOOD PRESSURE: 72 MMHG | HEART RATE: 73 BPM | TEMPERATURE: 97.6 F | BODY MASS INDEX: 23.92 KG/M2 | HEIGHT: 63 IN | WEIGHT: 135 LBS

## 2023-07-05 PROCEDURE — 99214 OFFICE O/P EST MOD 30 MIN: CPT

## 2023-07-05 RX ORDER — FUROSEMIDE 20 MG/1
20 TABLET ORAL
Qty: 38 | Refills: 0 | Status: ACTIVE | COMMUNITY
Start: 2023-04-25

## 2023-07-05 RX ORDER — METOPROLOL SUCCINATE 25 MG/1
25 TABLET, EXTENDED RELEASE ORAL
Qty: 90 | Refills: 0 | Status: ACTIVE | COMMUNITY
Start: 2023-04-25

## 2023-07-05 RX ORDER — ASPIRIN 81 MG/1
81 TABLET, COATED ORAL
Qty: 90 | Refills: 0 | Status: ACTIVE | COMMUNITY
Start: 2023-04-25

## 2023-07-05 NOTE — HISTORY OF PRESENT ILLNESS
[FreeTextEntry1] : 78-year-old woman, right-handed history of Parkinson disease here for follow-up visit symptoms are worse on the left, continues on Rytary 195 mg 1 3 times a day she feels a little bit better than the regular Sinemet.  Finds the effect lasted no longer.\par Tremors is and stiffness are worse on the left side, when she walks sometimes the left foot still impaired.  No new complaints, no recent falls or injuries.  No episodes of hallucinations, compulsive thinking.  Excessive daytime fatigue.\par Ports recently came back from a trip to [place], which adequate time, now scheduled to attend her granddaughter's wedding in [place].

## 2023-07-05 NOTE — DISCUSSION/SUMMARY
[FreeTextEntry1] : 78-year-old woman, right-handed history of Parkinson disease.  Stable tolerating medication well.  Does find the effects seem to wane, reviewed and discussed treatment options.\par Plan: We will change Rytary to 195, 4 times a day.\par Discussed possible side effects.\par Discussed other potential therapies, deep brain stimulation.\par Encouraged regular physical activities, regular exercise.\par Return to office, 6 months.

## 2023-07-05 NOTE — PHYSICAL EXAM
[General Appearance - Alert] : alert [General Appearance - In No Acute Distress] : in no acute distress [Oriented To Time, Place, And Person] : oriented to person, place, and time [Impaired Insight] : insight and judgment were intact [Affect] : the affect was normal [Person] : oriented to person [Place] : oriented to place [Time] : oriented to time [Fluency] : fluency intact [Cranial Nerves Optic (II)] : visual acuity intact bilaterally,  visual fields full to confrontation, pupils equal round and reactive to light [Cranial Nerves Oculomotor (III)] : extraocular motion intact [Cranial Nerves Trigeminal (V)] : facial sensation intact symmetrically [Cranial Nerves Facial (VII)] : face symmetrical [Cranial Nerves Vestibulocochlear (VIII)] : hearing was intact bilaterally [Cranial Nerves Glossopharyngeal (IX)] : tongue and palate midline [Cranial Nerves Accessory (XI - Cranial And Spinal)] : head turning and shoulder shrug symmetric [Cranial Nerves Hypoglossal (XII)] : there was no tongue deviation with protrusion [Motor Strength] : muscle strength was normal in all four extremities [Motor Handedness Right-Handed] : the patient is right hand dominant [Tremor] : a tremor present [1+] : Patella left 1+ [Motor Strength Upper Extremities Bilaterally] : strength was normal in both upper extremities [Motor Strength Lower Extremities Bilaterally] : strength was normal in both lower extremities [Dysdiadochokinesia Bilaterally] : not present [Coordination - Dysmetria Impaired Finger-to-Nose Bilateral] : not present [Coordination - Dysmetria Impaired Heel-to-Shin Bilateral] : not present [FreeTextEntry6] : Bradykinesia, slow alternating hand movements on the left, more than the right [FreeTextEntry8] : Mild festinating gait, with decreased arm swing, more on the left than the right.

## 2023-09-28 ENCOUNTER — RX RENEWAL (OUTPATIENT)
Age: 78
End: 2023-09-28

## 2023-10-01 PROBLEM — M54.16 LUMBAR RADICULAR PAIN: Status: ACTIVE | Noted: 2022-05-17

## 2024-01-30 ENCOUNTER — APPOINTMENT (OUTPATIENT)
Dept: PHYSICAL MEDICINE AND REHAB | Facility: CLINIC | Age: 79
End: 2024-01-30
Payer: MEDICARE

## 2024-01-30 VITALS
HEIGHT: 63 IN | HEART RATE: 75 BPM | OXYGEN SATURATION: 98 % | DIASTOLIC BLOOD PRESSURE: 97 MMHG | SYSTOLIC BLOOD PRESSURE: 161 MMHG | WEIGHT: 137 LBS | BODY MASS INDEX: 24.27 KG/M2

## 2024-01-30 PROCEDURE — 99214 OFFICE O/P EST MOD 30 MIN: CPT

## 2024-01-30 RX ORDER — DIAZEPAM 5 MG/1
5 TABLET ORAL
Qty: 1 | Refills: 0 | Status: ACTIVE | COMMUNITY
Start: 2024-01-30 | End: 1900-01-01

## 2024-01-30 NOTE — DATA REVIEWED
[FreeTextEntry1] : MRI [facility] L Spine 1/2022 reviewed and interpreted by me: multilevel spondylosis, no severe canal stenosis seen

## 2024-01-30 NOTE — ASSESSMENT
[FreeTextEntry1] : Ms. LANNY STUART is a 78 year old female who presents with left>right sided low back pain. Her pain is likely secondary to facet arthropathy. She got some relief with TOÑO but actually has long acting pain relief from a lumbar MBB.  Denies any red flag signs. Will recommend: - Discussed with patient the risks (including but not limited to bleeding, infection, nerve damage, etc), benefits and alternatives to a repeat medial branch block which patient understands and would like to proceed.  - Given new procedure being planned, will order new MRI L Spine - Continue HEP - Patient to continue Tylenol PRN, up to 3g/day if needed.  Return for MBB. Patient aware of red flag signs including any changes to their bowel/bladder control, groin numbness or new weakness. Patient knows to seek immediate attention by calling 911 or going to nearest ER if these symptoms appear.

## 2024-01-30 NOTE — PHYSICAL EXAM
[FreeTextEntry1] : PE:  Constitutional: In NAD, calm and cooperative MSK (Back)  Inspection: no gross swelling identified  Palpation: Mild tenderness of the left lower lumbar paraspinals  ROM: No pain at end lumbar flexion, mild pain at end lumbar extension  Strength: 5/5 strength in bilateral lower extremities  Reflexes: 2+ Patella reflex bilaterally, 2+ Achilles reflex bilaterally, negative clonus bilaterally  Sensation: Intact to light touch in bilateral lower extremities  Special tests:  SLR: negative on left TOREY: neg bilaterally FADIR: neg bilaterally Facet loading: positive bilaterally

## 2024-01-30 NOTE — HISTORY OF PRESENT ILLNESS
[FreeTextEntry1] : Ms. LANNY STUART is a 78 year old  female who presents for follow up. At last visit in 1/2023, she was ordered a 2nd lumbar MBB but did not get it as she was doing well. She said the pain came back a month ago and is now in an exacerbation. Has been taking Aleve/Ibuprofen with partial relief. Denies any new symptoms.   Location: left>right low back, but also over central back as well. Onset: a few years ago it started, and is worsening over the past month Provocation/Palliative: moving, standing worsens, lying down improves the pain Quality: dull pain Radiation: None at this time Severity: 5/10 today Timing: Worsening lately  No bowel/bladder changes. No groin numbness.

## 2024-02-15 ENCOUNTER — APPOINTMENT (OUTPATIENT)
Dept: PHYSICAL MEDICINE AND REHAB | Facility: CLINIC | Age: 79
End: 2024-02-15
Payer: MEDICARE

## 2024-02-15 PROCEDURE — 99213 OFFICE O/P EST LOW 20 MIN: CPT

## 2024-02-15 NOTE — PHYSICAL EXAM
[FreeTextEntry1] : Limited as this was a telemedicine visit. Patient AAOx3, calm, cooperative.  40.3

## 2024-02-15 NOTE — ASSESSMENT
[FreeTextEntry1] : Ms. LANNY STUART is a 78 year old female who presents with left>right sided low back pain. Her pain is likely secondary to facet arthropathy. She got some relief with TOÑO but actually has long acting pain relief from a lumbar MBB. Denies any red flag signs. Will recommend: - Again discussed with patient the risks (including but not limited to bleeding, infection, nerve damage, etc), benefits and alternatives to a repeat medial branch block which patient understands and would like to proceed. - Continue HEP - Patient to continue Tylenol PRN, up to 3g/day if needed.  Return for MBB. Patient aware of red flag signs including any changes to their bowel/bladder control, groin numbness or new weakness. Patient knows to seek immediate attention by calling 911 or going to nearest ER if these symptoms appear.

## 2024-02-15 NOTE — HISTORY OF PRESENT ILLNESS
[FreeTextEntry1] : This visit was conducted via an audiovisual call. Patient confirmed they were at home at [address] [place] [address] . Dr. Swartz was the office at [address] [place] [address] . Patient consented to the televisit.   Ms. LANNY STUART is a 78 year old female who presents for follow up. At last visit, she was ordered a repeat MBB, ordered a new MRI L Spine, continued on Tylenol. She has been taking Tylenol 1-3x/day. Denies any new symptoms.   Location: left>right low back, but also over central back as well. Onset: a few years ago it started, and is worsening over the past month Provocation/Palliative: moving, standing worsens, lying down improves the pain Quality: dull pain Radiation: None at this time Severity: 5-6/10 today Timing: Worsening lately  No bowel/bladder changes. No groin numbness.

## 2024-02-27 ENCOUNTER — APPOINTMENT (OUTPATIENT)
Dept: PHYSICAL MEDICINE AND REHAB | Facility: AMBULATORY MEDICAL SERVICES | Age: 79
End: 2024-02-27
Payer: MEDICARE

## 2024-02-27 PROCEDURE — 64493 INJ PARAVERT F JNT L/S 1 LEV: CPT | Mod: LT

## 2024-02-27 PROCEDURE — 64494 INJ PARAVERT F JNT L/S 2 LEV: CPT | Mod: LT

## 2024-02-27 NOTE — PROCEDURE
[de-identified] : Lumbar Medial Branch Block Under Fluoroscopic Guidance   Attending: Jeremy Swartz DO  PREOPERATIVE DIAGNOSIS: Lumbar Facet Joint Pain POSTOPERATIVE DIAGNOSIS: same  SEDATION: As per Anesthesia   PROCEDURE PERFORMED:  Lumbar Medial Branch Block at the left L4-5 and L5-S1   ESTIMATED BLOOD LOSS:  None FLUOROSCOPY WAS USED.    PROCEDURE AND FINDINGS:   Patient was greeted in the pre-procedure holding area. The risk, benefits and alternatives to the procedure were again reviewed with the patient and written informed consent was placed in the chart. They were taken to the procedure room and positioned prone on the fluoroscopy table.  Prior to the procedure a time out was completed, verifying correct patient, procedure, and site.     An AP fluoroscopic  film was taken to identify the vertebral bodies, pedicles, transverse processes and superior articulating processes of interest. The skin was prepped with chlorhexidine and draped in the usual sterile fashion. The skin and subcutaneous tissue overlying the aforementioned anatomic targets were anesthetized using a 27-gauge 1-1/2 inch needle with 1% preservative-free lidocaine for a total volume of 5 mls.     Then a 25-gauge 3.5 inch Quincke spinal needle was advanced under fluoroscopic guidance to the junction of the superior articular process and transverse process of the levels of interest (and at the junction of the sacral ala and superior articular process for the L5 dorsal ramus). After negative aspiration, 0.5 mls of contrast was injected under AP view at each level. There was no evidence of intravascular uptake or intrathecal spread on imaging.    At this point, after negative aspiration, a solution of 0.5ml of Bupivacaine 0.5% was injected at each level without incident. The needles were flushed with a small amount of contrast and removed. The needle insertion site was dressed appropriately.   Patient was taken to the recovery room where they were monitored for a brief period of time. They tolerated the procedure well and were discharged home in stable condition with post procedural instructions. Of note, she only complained of left sided pain so only the left side was done.

## 2024-03-08 ENCOUNTER — RX RENEWAL (OUTPATIENT)
Age: 79
End: 2024-03-08

## 2024-03-08 RX ORDER — LEVODOPA AND CARBIDOPA 195; 48.75 MG/1; MG/1
48.75-195 CAPSULE, EXTENDED RELEASE ORAL
Qty: 120 | Refills: 3 | Status: ACTIVE | COMMUNITY
Start: 2022-09-12 | End: 1900-01-01

## 2024-03-12 ENCOUNTER — APPOINTMENT (OUTPATIENT)
Dept: PHYSICAL MEDICINE AND REHAB | Facility: CLINIC | Age: 79
End: 2024-03-12
Payer: MEDICARE

## 2024-03-12 VITALS
HEART RATE: 67 BPM | WEIGHT: 136 LBS | SYSTOLIC BLOOD PRESSURE: 142 MMHG | BODY MASS INDEX: 24.1 KG/M2 | HEIGHT: 63 IN | RESPIRATION RATE: 14 BRPM | DIASTOLIC BLOOD PRESSURE: 84 MMHG

## 2024-03-12 DIAGNOSIS — M51.36 OTHER INTERVERTEBRAL DISC DEGENERATION, LUMBAR REGION: ICD-10-CM

## 2024-03-12 DIAGNOSIS — M47.816 SPONDYLOSIS W/OUT MYELOPATHY OR RADICULOPATHY, LUMBAR REGION: ICD-10-CM

## 2024-03-12 PROCEDURE — 99213 OFFICE O/P EST LOW 20 MIN: CPT

## 2024-03-12 NOTE — ASSESSMENT
[FreeTextEntry1] : Ms. LANNY STUART is a 78 year old female who presents with left>right sided low back pain. Her pain is likely secondary to facet arthropathy. She got some relief with TOÑO but actually has long acting pain relief from a lumbar MBB. Denies any red flag signs. Will recommend: - Since patient is still getting significant relief, will hold off on RFA for now. R/B/A of procedure discussed with patient.  - Continue HEP - Patient to continue Tylenol PRN, up to 3g/day if needed.  Return as needed. Patient aware of red flag signs including any changes to their bowel/bladder control, groin numbness or new weakness. Patient knows to seek immediate attention by calling 911 or going to nearest ER if these symptoms appear.

## 2024-03-12 NOTE — PHYSICAL EXAM
[FreeTextEntry1] : PE:  Constitutional: In NAD, calm and cooperative MSK (Back)  Inspection: no gross swelling identified, no swelling or erythema over injections sites   Palpation: Mild tenderness of the left lower lumbar paraspinals  ROM: No pain at end lumbar flexion, mild pain at end lumbar extension  Strength: 5/5 strength in bilateral lower extremities  Reflexes: 2+ Patella reflex bilaterally, 2+ Achilles reflex bilaterally, negative clonus bilaterally  Sensation: Intact to light touch in bilateral lower extremities  Special tests:  SLR: negative bilaterally  TOREY: neg bilaterally FADIR: neg bilaterally Facet loading: neg bilaterally

## 2024-03-12 NOTE — HISTORY OF PRESENT ILLNESS
[FreeTextEntry1] : Ms. LANNY STUART is a 78 year old  female who presents for follow up. At last visit, she was ordered a repeat MBB for which she had done on 2/27/24. She got 100% relief from the procedure and is still getting significant relief. Denies any significant pain at this time. Denies any side effects or adverse effects.   Previous pain: Location: left>right low back, but also over central back as well. Onset: a few years ago it started, and is worsening over the past month Provocation/Palliative: moving, standing worsens, lying down improves the pain Quality: dull pain Radiation: None at this time Severity: 5-6/10 today Timing: Worsening lately  No bowel/bladder changes. No groin numbness.

## 2024-07-18 ENCOUNTER — NON-APPOINTMENT (OUTPATIENT)
Age: 79
End: 2024-07-18

## 2024-07-19 ENCOUNTER — APPOINTMENT (OUTPATIENT)
Dept: NEUROLOGY | Facility: CLINIC | Age: 79
End: 2024-07-19
Payer: MEDICARE

## 2024-07-19 VITALS
HEIGHT: 63 IN | WEIGHT: 133 LBS | BODY MASS INDEX: 23.57 KG/M2 | SYSTOLIC BLOOD PRESSURE: 132 MMHG | TEMPERATURE: 98.2 F | HEART RATE: 82 BPM | DIASTOLIC BLOOD PRESSURE: 78 MMHG

## 2024-07-19 PROCEDURE — 99214 OFFICE O/P EST MOD 30 MIN: CPT

## 2024-07-19 PROCEDURE — G2211 COMPLEX E/M VISIT ADD ON: CPT

## 2024-10-18 ENCOUNTER — APPOINTMENT (OUTPATIENT)
Dept: ORTHOPEDIC SURGERY | Facility: CLINIC | Age: 79
End: 2024-10-18

## 2024-10-18 VITALS
WEIGHT: 135 LBS | SYSTOLIC BLOOD PRESSURE: 176 MMHG | HEIGHT: 63 IN | DIASTOLIC BLOOD PRESSURE: 95 MMHG | BODY MASS INDEX: 23.92 KG/M2 | HEART RATE: 64 BPM

## 2024-10-18 PROCEDURE — 99214 OFFICE O/P EST MOD 30 MIN: CPT

## 2024-10-18 PROCEDURE — 73502 X-RAY EXAM HIP UNI 2-3 VIEWS: CPT | Mod: 26,RT

## 2024-10-18 RX ORDER — METHYLPREDNISOLONE 4 MG/1
4 TABLET ORAL
Qty: 1 | Refills: 0 | Status: ACTIVE | COMMUNITY
Start: 2024-10-18 | End: 1900-01-01

## 2024-10-21 ENCOUNTER — APPOINTMENT (OUTPATIENT)
Dept: PHYSICAL MEDICINE AND REHAB | Facility: CLINIC | Age: 79
End: 2024-10-21
Payer: MEDICARE

## 2024-10-21 VITALS
SYSTOLIC BLOOD PRESSURE: 134 MMHG | DIASTOLIC BLOOD PRESSURE: 76 MMHG | RESPIRATION RATE: 16 BRPM | HEIGHT: 63 IN | WEIGHT: 135 LBS | BODY MASS INDEX: 23.92 KG/M2

## 2024-10-21 DIAGNOSIS — M67.951 UNSPECIFIED DISORDER OF SYNOVIUM AND TENDON, RIGHT THIGH: ICD-10-CM

## 2024-10-21 DIAGNOSIS — M47.816 SPONDYLOSIS W/OUT MYELOPATHY OR RADICULOPATHY, LUMBAR REGION: ICD-10-CM

## 2024-10-21 PROCEDURE — G2211 COMPLEX E/M VISIT ADD ON: CPT

## 2024-10-21 PROCEDURE — 99214 OFFICE O/P EST MOD 30 MIN: CPT

## 2024-10-21 RX ORDER — PREDNISONE 50 MG/1
TABLET ORAL
Refills: 0 | Status: ACTIVE | COMMUNITY

## 2024-10-25 ENCOUNTER — APPOINTMENT (OUTPATIENT)
Dept: ORTHOPEDIC SURGERY | Facility: CLINIC | Age: 79
End: 2024-10-25

## 2024-10-25 DIAGNOSIS — M51.369: ICD-10-CM

## 2024-10-25 PROCEDURE — 99441: CPT | Mod: 93

## 2024-11-12 ENCOUNTER — APPOINTMENT (OUTPATIENT)
Dept: PHYSICAL MEDICINE AND REHAB | Facility: CLINIC | Age: 79
End: 2024-11-12
Payer: MEDICARE

## 2024-11-12 VITALS
WEIGHT: 135 LBS | BODY MASS INDEX: 23.92 KG/M2 | RESPIRATION RATE: 14 BRPM | TEMPERATURE: 97.9 F | DIASTOLIC BLOOD PRESSURE: 93 MMHG | HEART RATE: 71 BPM | OXYGEN SATURATION: 98 % | HEIGHT: 63 IN | SYSTOLIC BLOOD PRESSURE: 151 MMHG

## 2024-11-12 DIAGNOSIS — M47.816 SPONDYLOSIS W/OUT MYELOPATHY OR RADICULOPATHY, LUMBAR REGION: ICD-10-CM

## 2024-11-12 DIAGNOSIS — M51.369: ICD-10-CM

## 2024-11-12 PROCEDURE — 99213 OFFICE O/P EST LOW 20 MIN: CPT

## 2024-11-12 PROCEDURE — G2211 COMPLEX E/M VISIT ADD ON: CPT

## 2025-04-17 ENCOUNTER — RX RENEWAL (OUTPATIENT)
Age: 80
End: 2025-04-17

## 2025-05-29 ENCOUNTER — APPOINTMENT (OUTPATIENT)
Dept: PHYSICAL MEDICINE AND REHAB | Facility: CLINIC | Age: 80
End: 2025-05-29
Payer: MEDICARE

## 2025-05-29 VITALS
BODY MASS INDEX: 23.92 KG/M2 | SYSTOLIC BLOOD PRESSURE: 156 MMHG | DIASTOLIC BLOOD PRESSURE: 90 MMHG | HEIGHT: 63 IN | RESPIRATION RATE: 15 BRPM | WEIGHT: 135 LBS | HEART RATE: 66 BPM

## 2025-05-29 DIAGNOSIS — M47.816 SPONDYLOSIS W/OUT MYELOPATHY OR RADICULOPATHY, LUMBAR REGION: ICD-10-CM

## 2025-05-29 PROCEDURE — 99214 OFFICE O/P EST MOD 30 MIN: CPT

## 2025-05-29 PROCEDURE — G2211 COMPLEX E/M VISIT ADD ON: CPT

## 2025-05-29 RX ORDER — METHYLPREDNISOLONE 4 MG/1
4 TABLET ORAL
Qty: 1 | Refills: 0 | Status: ACTIVE | COMMUNITY
Start: 2025-05-29 | End: 1900-01-01

## 2025-06-12 ENCOUNTER — NON-APPOINTMENT (OUTPATIENT)
Age: 80
End: 2025-06-12

## 2025-06-13 ENCOUNTER — APPOINTMENT (OUTPATIENT)
Dept: NEUROLOGY | Facility: CLINIC | Age: 80
End: 2025-06-13
Payer: MEDICARE

## 2025-06-13 VITALS
HEART RATE: 65 BPM | BODY MASS INDEX: 23.92 KG/M2 | DIASTOLIC BLOOD PRESSURE: 82 MMHG | HEIGHT: 63 IN | SYSTOLIC BLOOD PRESSURE: 137 MMHG | WEIGHT: 135 LBS | TEMPERATURE: 98.2 F

## 2025-06-13 PROCEDURE — G2211 COMPLEX E/M VISIT ADD ON: CPT

## 2025-06-13 PROCEDURE — 99214 OFFICE O/P EST MOD 30 MIN: CPT

## 2025-06-26 ENCOUNTER — APPOINTMENT (OUTPATIENT)
Dept: PHYSICAL MEDICINE AND REHAB | Facility: CLINIC | Age: 80
End: 2025-06-26

## 2025-07-16 NOTE — H&P PST ADULT - PRIMARY CARE PROVIDER
Called patient again at 1629.  Same recording as previously.    Called patient at 1308 on mobile number to triage.  Recording said patient is not accepting messages at this time.       [employer] message sent asking patient to call back.   Dr Santiago pcp  Dr Santiago pcp                           Dr Gillespie  cardiologist

## 2025-08-01 ENCOUNTER — APPOINTMENT (OUTPATIENT)
Dept: NEUROLOGY | Facility: CLINIC | Age: 80
End: 2025-08-01
Payer: MEDICARE

## 2025-08-01 DIAGNOSIS — Z86.59 PERSONAL HISTORY OF OTHER MENTAL AND BEHAVIORAL DISORDERS: ICD-10-CM

## 2025-08-01 DIAGNOSIS — D32.0 BENIGN NEOPLASM OF CEREBRAL MENINGES: ICD-10-CM

## 2025-08-01 DIAGNOSIS — G20.A1 PARKINSON'S DISEASE WITHOUT DYSKINESIA, WITHOUT MENTION OF FLUCTUATIONS: ICD-10-CM

## 2025-08-01 PROCEDURE — 99215 OFFICE O/P EST HI 40 MIN: CPT

## 2025-08-01 PROCEDURE — G2212 PROLONG OUTPT/OFFICE VIS: CPT

## 2025-08-12 ENCOUNTER — NON-APPOINTMENT (OUTPATIENT)
Age: 80
End: 2025-08-12